# Patient Record
Sex: MALE | Race: WHITE | NOT HISPANIC OR LATINO
[De-identification: names, ages, dates, MRNs, and addresses within clinical notes are randomized per-mention and may not be internally consistent; named-entity substitution may affect disease eponyms.]

---

## 2017-02-18 ENCOUNTER — APPOINTMENT (OUTPATIENT)
Dept: OTOLARYNGOLOGY | Facility: CLINIC | Age: 62
End: 2017-02-18

## 2017-02-18 VITALS
SYSTOLIC BLOOD PRESSURE: 160 MMHG | HEART RATE: 89 BPM | BODY MASS INDEX: 30.8 KG/M2 | WEIGHT: 240 LBS | DIASTOLIC BLOOD PRESSURE: 90 MMHG | HEIGHT: 74 IN

## 2017-08-21 ENCOUNTER — APPOINTMENT (OUTPATIENT)
Dept: OTOLARYNGOLOGY | Facility: CLINIC | Age: 62
End: 2017-08-21
Payer: COMMERCIAL

## 2017-08-21 VITALS
BODY MASS INDEX: 30.8 KG/M2 | SYSTOLIC BLOOD PRESSURE: 139 MMHG | WEIGHT: 240 LBS | HEIGHT: 74 IN | DIASTOLIC BLOOD PRESSURE: 75 MMHG

## 2017-08-21 PROCEDURE — 69220 CLEAN OUT MASTOID CAVITY: CPT

## 2017-08-21 PROCEDURE — 99213 OFFICE O/P EST LOW 20 MIN: CPT | Mod: 25

## 2017-08-21 RX ORDER — OXYBUTYNIN CHLORIDE 10 MG/1
10 TABLET, EXTENDED RELEASE ORAL
Qty: 30 | Refills: 0 | Status: ACTIVE | COMMUNITY
Start: 2017-01-10

## 2017-11-20 ENCOUNTER — APPOINTMENT (OUTPATIENT)
Dept: OTOLARYNGOLOGY | Facility: CLINIC | Age: 62
End: 2017-11-20
Payer: COMMERCIAL

## 2017-11-20 VITALS
HEIGHT: 74 IN | DIASTOLIC BLOOD PRESSURE: 90 MMHG | WEIGHT: 240 LBS | SYSTOLIC BLOOD PRESSURE: 139 MMHG | BODY MASS INDEX: 30.8 KG/M2

## 2017-11-20 DIAGNOSIS — H90.5 UNSPECIFIED SENSORINEURAL HEARING LOSS: ICD-10-CM

## 2017-11-20 PROCEDURE — 99213 OFFICE O/P EST LOW 20 MIN: CPT | Mod: 25

## 2017-11-20 PROCEDURE — 92567 TYMPANOMETRY: CPT

## 2017-11-20 PROCEDURE — 92557 COMPREHENSIVE HEARING TEST: CPT

## 2018-02-22 ENCOUNTER — APPOINTMENT (OUTPATIENT)
Dept: OTOLARYNGOLOGY | Facility: CLINIC | Age: 63
End: 2018-02-22
Payer: COMMERCIAL

## 2018-02-22 PROCEDURE — 69220 CLEAN OUT MASTOID CAVITY: CPT

## 2018-02-22 PROCEDURE — 99212 OFFICE O/P EST SF 10 MIN: CPT | Mod: 25

## 2018-02-22 RX ORDER — CIPROFLOXACIN HYDROCHLORIDE 500 MG/1
500 TABLET, FILM COATED ORAL TWICE DAILY
Qty: 20 | Refills: 1 | Status: COMPLETED | COMMUNITY
Start: 2017-02-18 | End: 2018-02-22

## 2018-02-22 RX ORDER — AMOXICILLIN AND CLAVULANATE POTASSIUM 875; 125 MG/1; MG/1
875-125 TABLET, COATED ORAL
Qty: 20 | Refills: 0 | Status: COMPLETED | COMMUNITY
Start: 2017-08-21 | End: 2018-02-22

## 2018-02-22 RX ORDER — AMOXICILLIN AND CLAVULANATE POTASSIUM 875; 125 MG/1; MG/1
875-125 TABLET, COATED ORAL
Qty: 20 | Refills: 0 | Status: COMPLETED | COMMUNITY
Start: 2017-11-20 | End: 2018-02-22

## 2018-02-28 ENCOUNTER — APPOINTMENT (OUTPATIENT)
Dept: OTOLARYNGOLOGY | Facility: CLINIC | Age: 63
End: 2018-02-28

## 2018-06-18 ENCOUNTER — APPOINTMENT (OUTPATIENT)
Dept: OTOLARYNGOLOGY | Facility: CLINIC | Age: 63
End: 2018-06-18
Payer: COMMERCIAL

## 2018-06-18 VITALS
BODY MASS INDEX: 31.44 KG/M2 | HEIGHT: 74 IN | DIASTOLIC BLOOD PRESSURE: 83 MMHG | SYSTOLIC BLOOD PRESSURE: 133 MMHG | WEIGHT: 245 LBS

## 2018-06-18 PROCEDURE — 99213 OFFICE O/P EST LOW 20 MIN: CPT

## 2018-10-17 ENCOUNTER — APPOINTMENT (OUTPATIENT)
Dept: OTOLARYNGOLOGY | Facility: CLINIC | Age: 63
End: 2018-10-17
Payer: COMMERCIAL

## 2018-10-17 VITALS
SYSTOLIC BLOOD PRESSURE: 140 MMHG | WEIGHT: 245 LBS | HEIGHT: 74 IN | DIASTOLIC BLOOD PRESSURE: 84 MMHG | BODY MASS INDEX: 31.44 KG/M2

## 2018-10-17 DIAGNOSIS — Z87.09 PERSONAL HISTORY OF OTHER DISEASES OF THE RESPIRATORY SYSTEM: ICD-10-CM

## 2018-10-17 DIAGNOSIS — B96.89 PERSONAL HISTORY OF OTHER DISEASES OF THE RESPIRATORY SYSTEM: ICD-10-CM

## 2018-10-17 PROCEDURE — 99214 OFFICE O/P EST MOD 30 MIN: CPT

## 2018-12-26 ENCOUNTER — MEDICATION RENEWAL (OUTPATIENT)
Age: 63
End: 2018-12-26

## 2019-01-16 ENCOUNTER — APPOINTMENT (OUTPATIENT)
Dept: OTOLARYNGOLOGY | Facility: CLINIC | Age: 64
End: 2019-01-16
Payer: COMMERCIAL

## 2019-01-16 PROCEDURE — 92567 TYMPANOMETRY: CPT

## 2019-01-16 PROCEDURE — 69220 CLEAN OUT MASTOID CAVITY: CPT | Mod: LT

## 2019-01-16 PROCEDURE — 99213 OFFICE O/P EST LOW 20 MIN: CPT | Mod: 25

## 2019-01-16 PROCEDURE — 92557 COMPREHENSIVE HEARING TEST: CPT

## 2019-01-18 NOTE — PROCEDURE
[Same] : same as the Pre Op Dx. [] : Debridement of Mastoid [FreeTextEntry1] : left CWD [FreeTextEntry4] : none [FreeTextEntry6] : Canal wall down mastoid debrided under microscope using suction, alligator and curette.  Patient tolerated procedure well - flat granulation under good control - filled with tobradex ointment again

## 2019-01-18 NOTE — PHYSICAL EXAM
[Midline] : trachea located in midline position [Normal] : no nystagmus [de-identified] : RIGHT TM NORMAL, LEFT CWD - flat granulation - filled tobradex ointment

## 2019-01-18 NOTE — HISTORY OF PRESENT ILLNESS
[de-identified] : 64 yo returns for routine left ear cleaning- hx left CWD - here for routine cleaning.   No c/o ear drainage or vertigo.  Has been having some recurrent sinus infections- last tx with augmentin in December- does not use any daily nasal spray

## 2019-01-26 ENCOUNTER — INPATIENT (INPATIENT)
Facility: HOSPITAL | Age: 64
LOS: 13 days | Discharge: HOME | End: 2019-02-09
Attending: INTERNAL MEDICINE | Admitting: INTERNAL MEDICINE
Payer: COMMERCIAL

## 2019-01-26 VITALS
WEIGHT: 240.08 LBS | RESPIRATION RATE: 18 BRPM | SYSTOLIC BLOOD PRESSURE: 132 MMHG | HEART RATE: 77 BPM | TEMPERATURE: 97 F | OXYGEN SATURATION: 100 % | DIASTOLIC BLOOD PRESSURE: 92 MMHG | HEIGHT: 74 IN

## 2019-01-26 LAB
ALBUMIN SERPL ELPH-MCNC: 3.8 G/DL — SIGNIFICANT CHANGE UP (ref 3.3–5)
ALP SERPL-CCNC: 90 U/L — SIGNIFICANT CHANGE UP (ref 40–120)
ALT FLD-CCNC: 161 U/L — HIGH (ref 12–78)
ANION GAP SERPL CALC-SCNC: 9 MMOL/L — SIGNIFICANT CHANGE UP (ref 5–17)
APPEARANCE UR: CLEAR — SIGNIFICANT CHANGE UP
APTT BLD: 30.7 SEC — SIGNIFICANT CHANGE UP (ref 27.5–36.3)
AST SERPL-CCNC: 63 U/L — HIGH (ref 15–37)
BACTERIA # UR AUTO: NEGATIVE — SIGNIFICANT CHANGE UP
BILIRUB SERPL-MCNC: 1.2 MG/DL — SIGNIFICANT CHANGE UP (ref 0.2–1.2)
BILIRUB UR-MCNC: NEGATIVE — SIGNIFICANT CHANGE UP
BUN SERPL-MCNC: 20 MG/DL — SIGNIFICANT CHANGE UP (ref 7–23)
CALCIUM SERPL-MCNC: 9.3 MG/DL — SIGNIFICANT CHANGE UP (ref 8.5–10.1)
CHLORIDE SERPL-SCNC: 114 MMOL/L — HIGH (ref 96–108)
CO2 SERPL-SCNC: 23 MMOL/L — SIGNIFICANT CHANGE UP (ref 22–31)
COLOR SPEC: YELLOW — SIGNIFICANT CHANGE UP
CREAT SERPL-MCNC: 1.46 MG/DL — HIGH (ref 0.5–1.3)
D DIMER BLD IA.RAPID-MCNC: 201 NG/ML DDU — SIGNIFICANT CHANGE UP
DIFF PNL FLD: NEGATIVE — SIGNIFICANT CHANGE UP
EPI CELLS # UR: SIGNIFICANT CHANGE UP
GLUCOSE SERPL-MCNC: 80 MG/DL — SIGNIFICANT CHANGE UP (ref 70–99)
GLUCOSE UR QL: NEGATIVE MG/DL — SIGNIFICANT CHANGE UP
HCT VFR BLD CALC: 51.1 % — HIGH (ref 39–50)
HGB BLD-MCNC: 17 G/DL — SIGNIFICANT CHANGE UP (ref 13–17)
INR BLD: 1.47 RATIO — HIGH (ref 0.88–1.16)
KETONES UR-MCNC: NEGATIVE — SIGNIFICANT CHANGE UP
LEUKOCYTE ESTERASE UR-ACNC: ABNORMAL
MAGNESIUM SERPL-MCNC: 1.9 MG/DL — SIGNIFICANT CHANGE UP (ref 1.6–2.6)
MCHC RBC-ENTMCNC: 29.6 PG — SIGNIFICANT CHANGE UP (ref 27–34)
MCHC RBC-ENTMCNC: 33.3 GM/DL — SIGNIFICANT CHANGE UP (ref 32–36)
MCV RBC AUTO: 88.9 FL — SIGNIFICANT CHANGE UP (ref 80–100)
NITRITE UR-MCNC: NEGATIVE — SIGNIFICANT CHANGE UP
NRBC # BLD: 0 /100 WBCS — SIGNIFICANT CHANGE UP (ref 0–0)
NT-PROBNP SERPL-SCNC: 5222 PG/ML — HIGH (ref 0–125)
PH UR: 5 — SIGNIFICANT CHANGE UP (ref 5–8)
PLATELET # BLD AUTO: 170 K/UL — SIGNIFICANT CHANGE UP (ref 150–400)
POTASSIUM SERPL-MCNC: 3.9 MMOL/L — SIGNIFICANT CHANGE UP (ref 3.5–5.3)
POTASSIUM SERPL-SCNC: 3.9 MMOL/L — SIGNIFICANT CHANGE UP (ref 3.5–5.3)
PROT SERPL-MCNC: 7 GM/DL — SIGNIFICANT CHANGE UP (ref 6–8.3)
PROT UR-MCNC: 15 MG/DL
PROTHROM AB SERPL-ACNC: 16.5 SEC — HIGH (ref 10–12.9)
RBC # BLD: 5.75 M/UL — SIGNIFICANT CHANGE UP (ref 4.2–5.8)
RBC # FLD: 12.6 % — SIGNIFICANT CHANGE UP (ref 10.3–14.5)
RBC CASTS # UR COMP ASSIST: NEGATIVE /HPF — SIGNIFICANT CHANGE UP (ref 0–4)
SODIUM SERPL-SCNC: 146 MMOL/L — HIGH (ref 135–145)
SP GR SPEC: 1.02 — SIGNIFICANT CHANGE UP (ref 1.01–1.02)
TROPONIN I SERPL-MCNC: 0.03 NG/ML — SIGNIFICANT CHANGE UP (ref 0.01–0.04)
UROBILINOGEN FLD QL: NEGATIVE MG/DL — SIGNIFICANT CHANGE UP
WBC # BLD: 7.93 K/UL — SIGNIFICANT CHANGE UP (ref 3.8–10.5)
WBC # FLD AUTO: 7.93 K/UL — SIGNIFICANT CHANGE UP (ref 3.8–10.5)
WBC UR QL: SIGNIFICANT CHANGE UP

## 2019-01-26 PROCEDURE — 71045 X-RAY EXAM CHEST 1 VIEW: CPT | Mod: 26

## 2019-01-26 PROCEDURE — 70450 CT HEAD/BRAIN W/O DYE: CPT | Mod: 26

## 2019-01-26 PROCEDURE — 99223 1ST HOSP IP/OBS HIGH 75: CPT

## 2019-01-26 PROCEDURE — 99285 EMERGENCY DEPT VISIT HI MDM: CPT

## 2019-01-26 PROCEDURE — 93010 ELECTROCARDIOGRAM REPORT: CPT | Mod: 76

## 2019-01-26 RX ORDER — HEPARIN SODIUM 5000 [USP'U]/ML
9000 INJECTION INTRAVENOUS; SUBCUTANEOUS EVERY 6 HOURS
Qty: 0 | Refills: 0 | Status: DISCONTINUED | OUTPATIENT
Start: 2019-01-26 | End: 2019-01-28

## 2019-01-26 RX ORDER — LEVOTHYROXINE SODIUM 125 MCG
25 TABLET ORAL DAILY
Qty: 0 | Refills: 0 | Status: DISCONTINUED | OUTPATIENT
Start: 2019-01-26 | End: 2019-02-09

## 2019-01-26 RX ORDER — TAMSULOSIN HYDROCHLORIDE 0.4 MG/1
0.4 CAPSULE ORAL AT BEDTIME
Qty: 0 | Refills: 0 | Status: DISCONTINUED | OUTPATIENT
Start: 2019-01-26 | End: 2019-02-09

## 2019-01-26 RX ORDER — DILTIAZEM HCL 120 MG
10 CAPSULE, EXT RELEASE 24 HR ORAL
Qty: 125 | Refills: 0 | Status: DISCONTINUED | OUTPATIENT
Start: 2019-01-26 | End: 2019-01-29

## 2019-01-26 RX ORDER — HEPARIN SODIUM 5000 [USP'U]/ML
4500 INJECTION INTRAVENOUS; SUBCUTANEOUS EVERY 6 HOURS
Qty: 0 | Refills: 0 | Status: DISCONTINUED | OUTPATIENT
Start: 2019-01-26 | End: 2019-01-28

## 2019-01-26 RX ORDER — OXYBUTYNIN CHLORIDE 5 MG
10 TABLET ORAL DAILY
Qty: 0 | Refills: 0 | Status: DISCONTINUED | OUTPATIENT
Start: 2019-01-26 | End: 2019-02-09

## 2019-01-26 RX ORDER — SODIUM CHLORIDE 9 MG/ML
1000 INJECTION INTRAMUSCULAR; INTRAVENOUS; SUBCUTANEOUS ONCE
Qty: 0 | Refills: 0 | Status: COMPLETED | OUTPATIENT
Start: 2019-01-26 | End: 2019-01-26

## 2019-01-26 RX ORDER — HEPARIN SODIUM 5000 [USP'U]/ML
INJECTION INTRAVENOUS; SUBCUTANEOUS
Qty: 25000 | Refills: 0 | Status: DISCONTINUED | OUTPATIENT
Start: 2019-01-26 | End: 2019-01-28

## 2019-01-26 RX ADMIN — SODIUM CHLORIDE 1000 MILLILITER(S): 9 INJECTION INTRAMUSCULAR; INTRAVENOUS; SUBCUTANEOUS at 18:07

## 2019-01-26 RX ADMIN — Medication 10 MG/HR: at 18:09

## 2019-01-26 RX ADMIN — SODIUM CHLORIDE 1000 MILLILITER(S): 9 INJECTION INTRAMUSCULAR; INTRAVENOUS; SUBCUTANEOUS at 19:07

## 2019-01-26 RX ADMIN — HEPARIN SODIUM 2000 UNIT(S)/HR: 5000 INJECTION INTRAVENOUS; SUBCUTANEOUS at 19:25

## 2019-01-26 NOTE — ED ADULT NURSE NOTE - OBJECTIVE STATEMENT
Pt BIBA s/p L sided facial "droop" as per family member at bedside. Pt as per family member became altered and confused was not at baseline and urinated on himself. Pt reports no previous medical hx prior. When pt arrived to Ed pt was a+ox3, states he's felt "weird" for a couple days but no symptoms were reported. Pt's HR was elevated, no cardiac hx prior. Pt denies chest pain/sob at this time. Neuro intact at this time.

## 2019-01-26 NOTE — ED ADULT TRIAGE NOTE - CHIEF COMPLAINT QUOTE
pt brought by EMS from birthday party states approx 5 mins prior to EMS arrival, pt developed slurred speech, diaphoresis, AMS and urinary incontinence. pt is currently awake alert and oriented x3 and is able to recall events leading to EMS arrival.

## 2019-01-26 NOTE — ED PROVIDER NOTE - CARE PLAN
Principal Discharge DX:	TIA (transient ischemic attack)  Secondary Diagnosis:	Rapid atrial fibrillation

## 2019-01-26 NOTE — ED PROVIDER NOTE - OBJECTIVE STATEMENT
62 y/o male with a PMHx of HLD presents to the ED c/o episode of AMS today. Pt was at a party today when he suddenly began having slurred speech, facial droop, and diaphoresis. +urinary incontinence. Episode lasted approximately 5 minutes, and then sx completely resolved. No LOC. Now with SOB. As per pt, he has been "not feeling well" for a month, with palpitations and SOB described as "throat tightness." Pt was seen by PMD, was prescribed Augmentin for possible sinus infection, but with no relief of SOB. Pt has been taking Tylenol PM for sx and for sleep aid. Pt also has a cyst on his abd, for which he is on abx. Denies tobacco use, illicit drug use. Pt drank 1.5 glasses of wine at party today. Pt has been on a diet to lose weight, has decreased his portion sizes and decreased sugary and fatty foods in his diet, does not eat red meat. Exercises 3-4 days per week. PMD- Dr. Kraft. DEE DEE.

## 2019-01-26 NOTE — ED PROVIDER NOTE - MEDICAL DECISION MAKING DETAILS
Pt with episode of slurred speech, facial droop, urinary incontinence, lasting about 4 minutes now resolved. Now with rapid A-fib. Plan for labs, hydration, Cardizem IV, CT head, admission.

## 2019-01-26 NOTE — ED PROVIDER NOTE - PROGRESS NOTE DETAILS
Scribe CD for ED attending, Doctor Baker. Pt improved. Carson DE JESSU for ED attending, Doctor Samuel. Spoke to Dr. Sandoval, who recommends heparinizing pt if CT negative.

## 2019-01-27 DIAGNOSIS — G45.9 TRANSIENT CEREBRAL ISCHEMIC ATTACK, UNSPECIFIED: ICD-10-CM

## 2019-01-27 LAB
ALBUMIN SERPL ELPH-MCNC: 3.4 G/DL — SIGNIFICANT CHANGE UP (ref 3.3–5)
ALP SERPL-CCNC: 68 U/L — SIGNIFICANT CHANGE UP (ref 40–120)
ALT FLD-CCNC: 126 U/L — HIGH (ref 12–78)
ANION GAP SERPL CALC-SCNC: 7 MMOL/L — SIGNIFICANT CHANGE UP (ref 5–17)
APTT BLD: 144.5 SEC — CRITICAL HIGH (ref 27.5–36.3)
APTT BLD: 90.5 SEC — HIGH (ref 27.5–36.3)
APTT BLD: 91.4 SEC — HIGH (ref 27.5–36.3)
AST SERPL-CCNC: 43 U/L — HIGH (ref 15–37)
BILIRUB SERPL-MCNC: 1.8 MG/DL — HIGH (ref 0.2–1.2)
BUN SERPL-MCNC: 13 MG/DL — SIGNIFICANT CHANGE UP (ref 7–23)
CALCIUM SERPL-MCNC: 8.8 MG/DL — SIGNIFICANT CHANGE UP (ref 8.5–10.1)
CHLORIDE SERPL-SCNC: 117 MMOL/L — HIGH (ref 96–108)
CHOLEST SERPL-MCNC: 147 MG/DL — SIGNIFICANT CHANGE UP (ref 10–199)
CO2 SERPL-SCNC: 21 MMOL/L — LOW (ref 22–31)
CREAT SERPL-MCNC: 1.25 MG/DL — SIGNIFICANT CHANGE UP (ref 0.5–1.3)
GLUCOSE SERPL-MCNC: 97 MG/DL — SIGNIFICANT CHANGE UP (ref 70–99)
HAV IGM SER-ACNC: SIGNIFICANT CHANGE UP
HBA1C BLD-MCNC: 5.3 % — SIGNIFICANT CHANGE UP (ref 4–5.6)
HBV CORE IGM SER-ACNC: SIGNIFICANT CHANGE UP
HBV SURFACE AG SER-ACNC: SIGNIFICANT CHANGE UP
HCT VFR BLD CALC: 46.6 % — SIGNIFICANT CHANGE UP (ref 39–50)
HCV AB S/CO SERPL IA: 0.14 S/CO — SIGNIFICANT CHANGE UP
HCV AB SERPL-IMP: SIGNIFICANT CHANGE UP
HDLC SERPL-MCNC: 43 MG/DL — SIGNIFICANT CHANGE UP
HGB BLD-MCNC: 15.5 G/DL — SIGNIFICANT CHANGE UP (ref 13–17)
LIPID PNL WITH DIRECT LDL SERPL: 91 MG/DL — SIGNIFICANT CHANGE UP
MCHC RBC-ENTMCNC: 29.5 PG — SIGNIFICANT CHANGE UP (ref 27–34)
MCHC RBC-ENTMCNC: 33.3 GM/DL — SIGNIFICANT CHANGE UP (ref 32–36)
MCV RBC AUTO: 88.6 FL — SIGNIFICANT CHANGE UP (ref 80–100)
NRBC # BLD: 0 /100 WBCS — SIGNIFICANT CHANGE UP (ref 0–0)
PLATELET # BLD AUTO: 149 K/UL — LOW (ref 150–400)
POTASSIUM SERPL-MCNC: 4.1 MMOL/L — SIGNIFICANT CHANGE UP (ref 3.5–5.3)
POTASSIUM SERPL-SCNC: 4.1 MMOL/L — SIGNIFICANT CHANGE UP (ref 3.5–5.3)
PROT SERPL-MCNC: 6.4 GM/DL — SIGNIFICANT CHANGE UP (ref 6–8.3)
RBC # BLD: 5.26 M/UL — SIGNIFICANT CHANGE UP (ref 4.2–5.8)
RBC # FLD: 12.6 % — SIGNIFICANT CHANGE UP (ref 10.3–14.5)
SODIUM SERPL-SCNC: 145 MMOL/L — SIGNIFICANT CHANGE UP (ref 135–145)
TOTAL CHOLESTEROL/HDL RATIO MEASUREMENT: 3.4 RATIO — SIGNIFICANT CHANGE UP (ref 3.4–9.6)
TRIGL SERPL-MCNC: 64 MG/DL — SIGNIFICANT CHANGE UP (ref 10–149)
TROPONIN I SERPL-MCNC: 0.04 NG/ML — SIGNIFICANT CHANGE UP (ref 0.01–0.04)
TSH SERPL-MCNC: 1.27 UU/ML — SIGNIFICANT CHANGE UP (ref 0.34–4.82)
WBC # BLD: 7.15 K/UL — SIGNIFICANT CHANGE UP (ref 3.8–10.5)
WBC # FLD AUTO: 7.15 K/UL — SIGNIFICANT CHANGE UP (ref 3.8–10.5)

## 2019-01-27 RX ORDER — INFLUENZA VIRUS VACCINE 15; 15; 15; 15 UG/.5ML; UG/.5ML; UG/.5ML; UG/.5ML
0.5 SUSPENSION INTRAMUSCULAR ONCE
Qty: 0 | Refills: 0 | Status: COMPLETED | OUTPATIENT
Start: 2019-01-27 | End: 2019-01-27

## 2019-01-27 RX ORDER — METOPROLOL TARTRATE 50 MG
25 TABLET ORAL
Qty: 0 | Refills: 0 | Status: DISCONTINUED | OUTPATIENT
Start: 2019-01-27 | End: 2019-01-28

## 2019-01-27 RX ORDER — ACETAMINOPHEN 500 MG
650 TABLET ORAL EVERY 6 HOURS
Qty: 0 | Refills: 0 | Status: DISCONTINUED | OUTPATIENT
Start: 2019-01-27 | End: 2019-01-27

## 2019-01-27 RX ORDER — ACETAMINOPHEN 500 MG
650 TABLET ORAL ONCE
Qty: 0 | Refills: 0 | Status: COMPLETED | OUTPATIENT
Start: 2019-01-27 | End: 2019-01-27

## 2019-01-27 RX ADMIN — Medication 25 MILLIGRAM(S): at 14:40

## 2019-01-27 RX ADMIN — HEPARIN SODIUM 1600 UNIT(S)/HR: 5000 INJECTION INTRAVENOUS; SUBCUTANEOUS at 11:20

## 2019-01-27 RX ADMIN — Medication 650 MILLIGRAM(S): at 04:31

## 2019-01-27 RX ADMIN — Medication 100 MILLIGRAM(S): at 17:30

## 2019-01-27 RX ADMIN — HEPARIN SODIUM 1600 UNIT(S)/HR: 5000 INJECTION INTRAVENOUS; SUBCUTANEOUS at 18:19

## 2019-01-27 RX ADMIN — Medication 25 MICROGRAM(S): at 06:25

## 2019-01-27 RX ADMIN — HEPARIN SODIUM 0 UNIT(S)/HR: 5000 INJECTION INTRAVENOUS; SUBCUTANEOUS at 10:17

## 2019-01-27 RX ADMIN — Medication 100 MILLIGRAM(S): at 06:25

## 2019-01-27 RX ADMIN — Medication 10 MILLIGRAM(S): at 12:40

## 2019-01-27 RX ADMIN — TAMSULOSIN HYDROCHLORIDE 0.4 MILLIGRAM(S): 0.4 CAPSULE ORAL at 21:29

## 2019-01-27 NOTE — CONSULT NOTE ADULT - SUBJECTIVE AND OBJECTIVE BOX
Patient is a 63y old  Male who presents with a chief complaint of complain of slurry speech and altered mental status (2019 00:15)      HPI:  62 yo male presents to the ED  with complain of episode of AMS today. Patient was at a party today when he suddenly began having slurred speech, facial droop, and diaphoresis. he also had +urinary incontinence. Episode lasted approximately 5 minutes, and then symptoms completely resolved. No LOC. Then he started to have SOB. He still felt SOB during his presentation in ED. As per patient, he has been "not feeling well" for a month, with palpitations and SOB described as "throat tightness." Patient was seen by PMD, was prescribed Augmentin for possible sinus infection, but with no relief of SOB. Patient has been taking Tylenol PM for symptoms and for sleep aid. Patient also has a cyst on his abd, for which he is on abx, doxycycline. Denies tobacco use, illicit drug use. Pt drank 1.5 glasses of wine at party today. Patient has been on a diet to lose weight, has decreased his portion sizes and decreased sugary and fatty foods in his diet, does not eat red meat. Exercises 3-4 days per week.  During my evaluation at the bed side patient was totally any symptom free. No slurry speech no sob, no palpitation.   He exercises regulary without CP , although over the past week he's noted a decline in execrise function.  He's also noted that over the past month he's had intermittent palpitations but more sustained over the past few days    Family Hx:  Mother: Alive, 91 yr, had colon cancer, DM, Aortic valve replacement  Father:  from Stomach cancer    PMHx:  BPH  Hypothyroidism     PSHx:  No recent surgeries. (2019 00:15)      PAST MEDICAL & SURGICAL HISTORY:  HLD (hyperlipidemia)                                    MEDICATIONS  (STANDING):  diltiazem    Tablet 30 milliGRAM(s) Oral every 6 hours  diltiazem Infusion 10 mG/Hr (10 mL/Hr) IV Continuous <Continuous>  doxycycline hyclate Capsule 100 milliGRAM(s) Oral every 12 hours  heparin  Infusion.  Unit(s)/Hr (20 mL/Hr) IV Continuous <Continuous>  influenza   Vaccine 0.5 milliLiter(s) IntraMuscular once  levothyroxine 25 MICROGram(s) Oral daily  oxybutynin 10 milliGRAM(s) Oral daily  tamsulosin 0.4 milliGRAM(s) Oral at bedtime    MEDICATIONS  (PRN):  heparin  Injectable 9000 Unit(s) IV Push every 6 hours PRN For aPTT less than 40  heparin  Injectable 4500 Unit(s) IV Push every 6 hours PRN For aPTT between 40 - 57      FAMILY HISTORY:  NC    SOCIAL HISTORY: former smoker quit 5 years ago     CIGARETTES:        Vital Signs Last 24 Hrs  T(C): 36.7 (2019 04:26), Max: 37.1 (2019 20:12)  T(F): 98 (2019 04:26), Max: 98.8 (2019 20:12)  HR: 70 (2019 04:26) (70 - 116)  BP: 110/65 (2019 04:26) (105/78 - 143/84)  BP(mean): --  RR: 18 (2019 03:31) (16 - 22)  SpO2: 98% (2019 04:26) (97% - 100%)            INTERPRETATION OF TELEMETRY: AFIb    ECG: AFIb no acute STT changes    I&O's Detail    2019 07:01  -  2019 07:00  --------------------------------------------------------  IN:    diltiazem Infusion: 146.8 mL    heparin  Infusion.: 218.9 mL  Total IN: 365.7 mL    OUT:  Total OUT: 0 mL    Total NET: 365.7 mL          LABS:                        15.5   7.15  )-----------( 149      ( 2019 01:13 )             46.6         146<H>  |  114<H>  |  20  ----------------------------<  80  3.9   |  23  |  1.46<H>    Ca    9.3      2019 17:53  Mg     1.9         TPro  7.0  /  Alb  3.8  /  TBili  1.2  /  DBili  x   /  AST  63<H>  /  ALT  161<H>  /  AlkPhos  90      CARDIAC MARKERS ( 2019 17:53 )  0.029 ng/mL / x     / x     / x     / x          PT/INR - ( 2019 17:53 )   PT: 16.5 sec;   INR: 1.47 ratio         PTT - ( 2019 01:13 )  PTT:90.5 sec  Urinalysis Basic - ( 2019 19:30 )    Color: Yellow / Appearance: Clear / S.025 / pH: x  Gluc: x / Ketone: Negative  / Bili: Negative / Urobili: Negative mg/dL   Blood: x / Protein: 15 mg/dL / Nitrite: Negative   Leuk Esterase: Trace / RBC: Negative /HPF / WBC 3-5   Sq Epi: x / Non Sq Epi: Occasional / Bacteria: Negative      I&O's Summary    2019 07:01  -  2019 07:00  --------------------------------------------------------  IN: 365.7 mL / OUT: 0 mL / NET: 365.7 mL      BNPSerum Pro-Brain Natriuretic Peptide: 5222 pg/mL ( @ 17:53)    RADIOLOGY & ADDITIONAL STUDIES:

## 2019-01-27 NOTE — CONSULT NOTE ADULT - ASSESSMENT
63 year old man with new a-fib, transient facial weakness, slurred speech; non focal exam presently. Initial head CT showed no acute findings. ?Probably embolic event, ? CVA vs TIA

## 2019-01-27 NOTE — SWALLOW BEDSIDE ASSESSMENT ADULT - SLP GENERAL OBSERVATIONS
The pt was alert and interactive. He was somewhat anxious and tearful at times. the pt c/o a transient facial droop which precipitated this admission but now resolved. He is currently oriented x3+ and able to verbalize during communicative probes as well as in conversation without evidence of an overt primary motor speech or primary linguistic pathology. The pt is communicatively competent and reportedly at communicative baseline. Pt denied Odynophagia or aspiration signs when eating. The pt was alert and interactive. He was somewhat anxious and tearful at times. The pt c/o a transient facial droop which precipitated this admission but now resolved. He is currently oriented x3+ and able to verbalize during communicative probes as well as in conversation without evidence of an overt primary motor speech or primary linguistic pathology. The pt is communicatively competent and reportedly at communicative baseline. Pt denied Odynophagia or aspiration signs when eating.

## 2019-01-27 NOTE — CONSULT NOTE ADULT - ASSESSMENT
64 yo male presents to the ED  with complain of episode of AMS today. Patient was at a party today when he suddenly began having slurred speech, facial droop, and diaphoresis. he also had +urinary incontinence. Episode lasted approximately 5 minutes, and then symptoms completely resolved. No LOC. Then he started to have SOB. He still felt SOB during his presentation in ED. As per patient, he has been "not feeling well" for a month, with palpitations and SOB described as "throat tightness." Patient was seen by PMD, was prescribed Augmentin for possible sinus infection, but with no relief of SOB. Patient has been taking Tylenol PM for symptoms and for sleep aid. Patient also has a cyst on his abd, for which he is on abx, doxycycline. Denies tobacco use, illicit drug use. Pt drank 1.5 glasses of wine at party today. Patient has been on a diet to lose weight, has decreased his portion sizes and decreased sugary and fatty foods in his diet, does not eat red meat. Exercises 3-4 days per week.  1) will start with TTE   2) start LOpressor PO for HR control  3) can change to eliquis if OK with neuro   4) wean off IV cardizem  5) outpt ischmic brand  6)would not cardiovert givne recent neurologic event

## 2019-01-27 NOTE — SWALLOW BEDSIDE ASSESSMENT ADULT - SWALLOW EVAL: RECOMMENDED DIET
SUGGEST A REGULAR TEXTURE DIET WITH THIN LIQUID CONSISTENCIES AS HE TOLERATES THE SAME FROM AN OROPHARYNGEAL SWALLOWING PERSPECTIVE AT THIS TIME.

## 2019-01-27 NOTE — H&P ADULT - NEUROLOGICAL DETAILS
deep reflexes intact/normal strength/sensation intact/alert and oriented x 3/responds to pain/cranial nerves intact/responds to verbal commands

## 2019-01-27 NOTE — PHYSICAL THERAPY INITIAL EVALUATION ADULT - PERTINENT HX OF CURRENT PROBLEM, REHAB EVAL
62 yo male presents to the ED  with complain of episode of AMS today. Patient was at a party today when he suddenly began having slurred speech, facial droop, and diaphoresis. he also had +urinary incontinence. Episode lasted approximately 5 minutes, and then symptoms completely resolved. No LOC. Then he started to have SOB. He still felt SOB during his presentation in ED. As per patient, he has been "not feeling well" for a month, with palpitations and SOB described as "throat tightness."

## 2019-01-27 NOTE — SWALLOW BEDSIDE ASSESSMENT ADULT - SWALLOW EVAL: CRITERIA FOR SKILLED INTERVENTION MET
ACUTE SPEECH PATHOLOGY INTERVENTION IS NOT CLINICALLY WARRANTED. HIS SPEECH-LANGUAGE AND OROPHARYNGEAL SWALLOWING ABILITIES ARE WITHIN FUNCTIONAL PARAMETERS. GIVEN ABOVE, WILL NOT ACTIVELY FOLLOW. RECONSULT PRN.

## 2019-01-27 NOTE — SWALLOW BEDSIDE ASSESSMENT ADULT - COMMENTS
The pt was admitted to  with right facial droop, slurred speech, diaphoresis and SOB. His symptoms are improving. He has an underlying history of HLD, BPH and hypothyroidism.

## 2019-01-27 NOTE — H&P ADULT - HISTORY OF PRESENT ILLNESS
62 yo male presents to the ED  with complain of episode of AMS today. Patient was at a party today when he suddenly began having slurred speech, facial droop, and diaphoresis. he also had +urinary incontinence. Episode lasted approximately 5 minutes, and then symptoms completely resolved. No LOC. Then he started to have SOB. He still felt SOB during his presentation in ED. As per patient, he has been "not feeling well" for a month, with palpitations and SOB described as "throat tightness." Patient was seen by PMD, was prescribed Augmentin for possible sinus infection, but with no relief of SOB. Patient has been taking Tylenol PM for symptoms and for sleep aid. Patient also has a cyst on his abd, for which he is on abx, doxycycline. Denies tobacco use, illicit drug use. Pt drank 1.5 glasses of wine at party today. Patient has been on a diet to lose weight, has decreased his portion sizes and decreased sugary and fatty foods in his diet, does not eat red meat. Exercises 3-4 days per week.  During my evaluation at the bed side patient was totally any symptom free. No slurry speech no sob, no palpitation.     Family Hx:  Mother: Alive, 91 yr, had colon cancer, DM, Aortic valve replacement  Father:  from Stomach cancer    PMHx:  BPH  Hypothyroidism     PSHx:  No recent surgeries.

## 2019-01-27 NOTE — PROVIDER CONTACT NOTE (OTHER) - SITUATION
md aware of consult called dr. grayson's service, was told dr. acuña is on call. left consult with dr. le service. md aware of consult

## 2019-01-27 NOTE — CONSULT NOTE ADULT - SUBJECTIVE AND OBJECTIVE BOX
Neurology Consult requested by:   Patient is a 63y old  Male who presents with a chief complaint of complain of slurry speech and altered mental status (2019 09:46)     HPI:  64 yo male presents to the ED, after transient episode of slurred speech, facial weakness, which happened while at a party. Symptoms resolved, with no sequelae. Denies associated weakness, numbness of the extremities, dizziness, loss of vision no double vision, no headache.   Felt sweaty, and also had urinary incontinence. Episode lasted approximately 5 minutes, and then symptoms completely resolved. No LOC.   Noticed palpitations, which he has been feeling for several weeks, also last few days LUNA.  Denies tobacco use, illicit drug use. Pt drank 1.5 glasses of wine at party today.     Family Hx:  Mother: Alive, 91 yr, had colon cancer, DM, Aortic valve replacement  Father:  from Stomach cancer    PMHx:  BPH  Hypothyroidism     PSHx:  No recent surgeries. (2019 00:15)      PAST MEDICAL & SURGICAL HISTORY:  HLD (hyperlipidemia)    FAMILY HISTORY:    Social Hx:  Nonsmoker, no drug or alcohol use  Medications and Allergies ReviewedMEDICATIONS  (STANDING):  diltiazem    Tablet 30 milliGRAM(s) Oral every 6 hours  diltiazem Infusion 10 mG/Hr (10 mL/Hr) IV Continuous <Continuous>  doxycycline hyclate Capsule 100 milliGRAM(s) Oral every 12 hours  heparin  Infusion.  Unit(s)/Hr (20 mL/Hr) IV Continuous <Continuous>  influenza   Vaccine 0.5 milliLiter(s) IntraMuscular once  levothyroxine 25 MICROGram(s) Oral daily  oxybutynin 10 milliGRAM(s) Oral daily  tamsulosin 0.4 milliGRAM(s) Oral at bedtime     ROS: Pertinent positives in HPI, all other ROS were reviewed and are negative.      Examination:   Vital Signs Last 24 Hrs  T(C): 36.7 (2019 04:26), Max: 37.1 (2019 20:12)  T(F): 98 (2019 04:26), Max: 98.8 (2019 20:12)  HR: 70 (2019 04:26) (70 - 116)  BP: 110/65 (2019 04:26) (105/78 - 143/84)  BP(mean): --  RR: 18 (2019 03:31) (16 - 22)  SpO2: 98% (2019 04:26) (97% - 100%)  General: Cooperative, NAD   NECK: supple, no masses  ENT: Normal hearing   Vascular : no carotid bruits,   Lungs: CTAB  Chest: RRR, no murmurs  Extremities: nontender, no edema  Musculoskeletal: no adventitious movements, no joint stiffness  Skin: no rash    Neurological Examination:  NIHSS:0  MS: AOx3. Appropriately interactive, normal affect. Speech fluent w/o paraphasic error, repetition, naming, reading is intact   CN: VFFTC, PERLL, EOMI, V1-3 sensation intact, face symmetric, hearing intact, palate elevates symmetrically, tongue midline, SCM equal bilaterally  Motor: normal bulk and tone, no tremor, rigidity or bradykinesia.  5/5 all over   Sens: Intact to light touch.    Reflexes: 2/4 all over, downgoing toes b/l  Coord:  No dysmetria, THANG intact   Gait: normal    Labs:   Comprehensive Metabolic Panel in AM (19 @ 09:22)    Sodium, Serum: 145 mmol/L    Potassium, Serum: 4.1 mmol/L    Chloride, Serum: 117 mmol/L    Carbon Dioxide, Serum: 21 mmol/L    Anion Gap, Serum: 7 mmol/L    Blood Urea Nitrogen, Serum: 13 mg/dL    Creatinine, Serum: 1.25 mg/dL    Glucose, Serum: 97 mg/dL    Calcium, Total Serum: 8.8 mg/dL    Protein Total, Serum: 6.4 gm/dL    Albumin, Serum: 3.4 g/dL    Bilirubin Total, Serum: 1.8 mg/dL    Alkaline Phosphatase, Serum: 68 U/L    Aspartate Aminotransferase (AST/SGOT): 43 U/L    Alanine Aminotransferase (ALT/SGPT): 126 U/L    eGFR if Non : 62    Complete Blood Count (19 @ 01:13)    Nucleated RBC: 0 /100 WBCs    WBC Count: 7.15 K/uL    RBC Count: 5.26 M/uL    Hemoglobin: 15.5 g/dL    Hematocrit: 46.6 %    Mean Cell Volume: 88.6 fl    Mean Cell Hemoglobin: 29.5 pg    Mean Cell Hemoglobin Conc: 33.3 gm/dL    Red Cell Distrib Width: 12.6 %    Platelet Count - Automated: 149 K/uL    Imaging:   < from: CT Head No Cont (01.26.19 @ 18:17) >  IMPRESSION:     No acute intracranial bleeding, mass effect, or shift. Mild chronic   microvascular ischemic changes.

## 2019-01-27 NOTE — SWALLOW BEDSIDE ASSESSMENT ADULT - ASR SWALLOW LINGUAL MOBILITY
within functional limits/No tongue focality noted on exam./impaired right lateral movement within functional limits/No tongue focality noted on exam.

## 2019-01-27 NOTE — SWALLOW BEDSIDE ASSESSMENT ADULT - SWALLOW EVAL: DIAGNOSIS
1) The pt demonstrates Oropharyngeal Swallowing abilities which subjectively appear to be stable and within functional parameters for age. No behavioral aspiration signs exhibited on exam. Odynophagia was denied.   2) The pt was alert and interactive. He was somewhat anxious and tearful at times. the pt c/o a transient facial droop which precipitated this admission but now resolved. He is currently oriented x3+ and able to verbalize during communicative probes as well as in conversation without evidence of an overt primary motor speech or primary linguistic pathology. The pt is communicatively competent and reportedly at communicative baseline. 1) The pt demonstrates Oropharyngeal Swallowing abilities which subjectively appear to be stable and within functional parameters for age. No behavioral aspiration signs exhibited on exam. Odynophagia was denied.   2) The pt was alert and interactive. He was somewhat anxious and tearful at times. The pt c/o a transient facial droop which precipitated this admission but now resolved. He is currently oriented x3+ and able to verbalize during communicative probes as well as in conversation without evidence of an overt primary motor speech or primary linguistic pathology. The pt is communicatively competent and reportedly at communicative baseline.

## 2019-01-27 NOTE — H&P ADULT - ASSESSMENT
62 yo male presented with h/o AMS and Slurry speech today.    A/P:    1.  Altered mental status-resolved   TIA  A fib with RVR  -clinically better  -will continue to follow in telemetry  -follow troponin  -on IV Cardizem drip  -also started on PO Cardizem  -will wean off from Cardizem drip as tolerates  -on heparin drip    -follow neurology consult   -follow cardiology consult   -follow PT eval. speech eval  -follow echo report, carotid duplex report  -follow labs-A1C, lipid profile      2.  BPH  Hypothyroidism  -continue home meds   -follow TSH due to new onset A fib     3.  DVT ppx-on heparin drip     4.  Code status: Full code 62 yo male presented with h/o AMS and Slurry speech today.    A/P:    1.  Altered mental status-resolved   TIA  A fib with RVR  -clinically better  -will continue to follow in telemetry  -follow troponin  -on IV Cardizem drip  -also started on PO Cardizem  -will wean off from Cardizem drip as tolerates  -on heparin drip    -follow neurology consult   -follow cardiology consult   -follow PT eval. speech eval  -follow echo report, carotid duplex report  -follow labs-A1C, lipid profile      2.  BPH  Hypothyroidism  -continue home meds   -follow TSH due to new onset A fib     3.  Transaminitis  -follow CMP, hep panel, US abd    4.  CKD stage 3 vs Acute Kidney Injury  -will follow repeat labs at am  -follow US abd    5.  DVT ppx-on heparin drip     6.  Code status: Full code

## 2019-01-27 NOTE — H&P ADULT - NSHPPHYSICALEXAM_GEN_ALL_CORE
Vital Signs Last 24 Hrs  T(C): 37.1 (26 Jan 2019 20:12), Max: 37.1 (26 Jan 2019 20:12)  T(F): 98.8 (26 Jan 2019 20:12), Max: 98.8 (26 Jan 2019 20:12)  HR: 98 (26 Jan 2019 21:49) (77 - 116)  BP: 111/79 (26 Jan 2019 21:49) (105/78 - 140/85)  RR: 18 (26 Jan 2019 21:49) (16 - 22)  SpO2: 99% (26 Jan 2019 21:49) (99% - 100%)

## 2019-01-28 LAB
APTT BLD: 75.8 SEC — HIGH (ref 27.5–36.3)
APTT BLD: 84.3 SEC — HIGH (ref 27.5–36.3)
HCT VFR BLD CALC: 46.6 % — SIGNIFICANT CHANGE UP (ref 39–50)
HGB BLD-MCNC: 15.5 G/DL — SIGNIFICANT CHANGE UP (ref 13–17)
MCHC RBC-ENTMCNC: 29 PG — SIGNIFICANT CHANGE UP (ref 27–34)
MCHC RBC-ENTMCNC: 33.3 GM/DL — SIGNIFICANT CHANGE UP (ref 32–36)
MCV RBC AUTO: 87.3 FL — SIGNIFICANT CHANGE UP (ref 80–100)
NRBC # BLD: 0 /100 WBCS — SIGNIFICANT CHANGE UP (ref 0–0)
PLATELET # BLD AUTO: 155 K/UL — SIGNIFICANT CHANGE UP (ref 150–400)
RBC # BLD: 5.34 M/UL — SIGNIFICANT CHANGE UP (ref 4.2–5.8)
RBC # FLD: 12.9 % — SIGNIFICANT CHANGE UP (ref 10.3–14.5)
WBC # BLD: 6.55 K/UL — SIGNIFICANT CHANGE UP (ref 3.8–10.5)
WBC # FLD AUTO: 6.55 K/UL — SIGNIFICANT CHANGE UP (ref 3.8–10.5)

## 2019-01-28 PROCEDURE — 93306 TTE W/DOPPLER COMPLETE: CPT | Mod: 26

## 2019-01-28 PROCEDURE — 93880 EXTRACRANIAL BILAT STUDY: CPT | Mod: 26

## 2019-01-28 PROCEDURE — 70551 MRI BRAIN STEM W/O DYE: CPT | Mod: 26

## 2019-01-28 PROCEDURE — 76700 US EXAM ABDOM COMPLETE: CPT | Mod: 26

## 2019-01-28 RX ORDER — APIXABAN 2.5 MG/1
5 TABLET, FILM COATED ORAL EVERY 12 HOURS
Qty: 0 | Refills: 0 | Status: DISCONTINUED | OUTPATIENT
Start: 2019-01-28 | End: 2019-01-30

## 2019-01-28 RX ORDER — DIAZEPAM 5 MG
2 TABLET ORAL ONCE
Qty: 0 | Refills: 0 | Status: DISCONTINUED | OUTPATIENT
Start: 2019-01-28 | End: 2019-01-28

## 2019-01-28 RX ORDER — METOPROLOL TARTRATE 50 MG
25 TABLET ORAL ONCE
Qty: 0 | Refills: 0 | Status: COMPLETED | OUTPATIENT
Start: 2019-01-28 | End: 2019-01-28

## 2019-01-28 RX ORDER — METOPROLOL TARTRATE 50 MG
100 TABLET ORAL
Qty: 0 | Refills: 0 | Status: DISCONTINUED | OUTPATIENT
Start: 2019-01-28 | End: 2019-01-29

## 2019-01-28 RX ORDER — FUROSEMIDE 40 MG
20 TABLET ORAL ONCE
Qty: 0 | Refills: 0 | Status: COMPLETED | OUTPATIENT
Start: 2019-01-28 | End: 2019-01-28

## 2019-01-28 RX ORDER — METOPROLOL TARTRATE 50 MG
50 TABLET ORAL
Qty: 0 | Refills: 0 | Status: DISCONTINUED | OUTPATIENT
Start: 2019-01-28 | End: 2019-01-28

## 2019-01-28 RX ADMIN — APIXABAN 5 MILLIGRAM(S): 2.5 TABLET, FILM COATED ORAL at 18:14

## 2019-01-28 RX ADMIN — HEPARIN SODIUM 1600 UNIT(S)/HR: 5000 INJECTION INTRAVENOUS; SUBCUTANEOUS at 08:00

## 2019-01-28 RX ADMIN — Medication 100 MILLIGRAM(S): at 18:31

## 2019-01-28 RX ADMIN — Medication 20 MILLIGRAM(S): at 18:14

## 2019-01-28 RX ADMIN — Medication 1 TABLET(S): at 18:14

## 2019-01-28 RX ADMIN — Medication 25 MILLIGRAM(S): at 05:31

## 2019-01-28 RX ADMIN — Medication 10 MILLIGRAM(S): at 12:33

## 2019-01-28 RX ADMIN — TAMSULOSIN HYDROCHLORIDE 0.4 MILLIGRAM(S): 0.4 CAPSULE ORAL at 21:01

## 2019-01-28 RX ADMIN — Medication 100 MILLIGRAM(S): at 05:31

## 2019-01-28 RX ADMIN — Medication 2 MILLIGRAM(S): at 09:31

## 2019-01-28 RX ADMIN — HEPARIN SODIUM 1600 UNIT(S)/HR: 5000 INJECTION INTRAVENOUS; SUBCUTANEOUS at 01:15

## 2019-01-28 RX ADMIN — Medication 25 MILLIGRAM(S): at 12:33

## 2019-01-28 RX ADMIN — Medication 25 MICROGRAM(S): at 05:31

## 2019-01-28 NOTE — PROGRESS NOTE ADULT - SUBJECTIVE AND OBJECTIVE BOX
64 yo male presents to the ED  with complain of episode of AMS today. Patient was at a party today when he suddenly began having slurred speech, facial droop, and diaphoresis. he also had +urinary incontinence. Episode lasted approximately 5 minutes, and then symptoms completely resolved. No LOC. Then he started to have SOB. He still felt SOB during his presentation in ED. As per patient, he has been "not feeling well" for a month, with palpitations and SOB described as "throat tightness." Patient was seen by PMD, was prescribed Augmentin for possible sinus infection, but with no relief of SOB. Patient has been taking Tylenol PM for symptoms and for sleep aid. Patient also has a cyst on his abd, for which he is on abx, doxycycline. Denies tobacco use, illicit drug use. Pt drank 1.5 glasses of wine at party today. Patient has been on a diet to lose weight, has decreased his portion sizes and decreased sugary and fatty foods in his diet, does not eat red meat. Exercises 3-4 days per week.    1.28: no distress            REVIEW OF SYSTEMS:    CONSTITUTIONAL: No weakness, No fevers or chills  ENT: No ear ache, No sorethroat  NECK: No pain, No stiffness  RESPIRATORY: No cough, No wheezing, No hemoptysis; No dyspnea  CARDIOVASCULAR: No chest pain, No palpitations  GASTROINTESTINAL: No abd pain, No nausea, No vomiting, No hematemesis, No diarrhea or constipation. No melena, No hematochezia.  GENITOURINARY: No dysuria, No  hematuria  NEUROLOGICAL: No diplopia, No paresthesia, No motor dysfunction  MUSCULOSKELETAL: No arthralgia, No myalgia  SKIN: No rashes, or lesions   PSYCH: no anxiety, no suicidal ideation    All other review of systems is negative unless indicated above    Vital Signs Last 24 Hrs  T(C): 36.7 (28 Jan 2019 12:32), Max: 36.8 (27 Jan 2019 17:14)  T(F): 98 (28 Jan 2019 12:32), Max: 98.2 (27 Jan 2019 17:14)  HR: 120 (28 Jan 2019 12:32) (72 - 120)  BP: 125/94 (28 Jan 2019 12:32) (106/59 - 125/94)  BP(mean): --  RR: 18 (28 Jan 2019 12:32) (18 - 18)  SpO2: 99% (28 Jan 2019 12:32) (96% - 99%)    PHYSICAL EXAM:    GENERAL: NAD, Well nourished  HEENT:  NC/AT, EOMI, PERRLA, No scleral icterus, Moist mucous membranes  NECK: Supple, No JVD  CNS:  Alert & Oriented X3, Motor Strength 5/5 B/L upper and lower extremities; DTRs 2+ intact   LUNG: Normal Breath sounds, Clear to auscultation bilaterally, No rales, No rhonchi, No wheezing  HEART: RRR; No murmurs, No rubs  ABDOMEN: +BS, ST/ND/NT  GENITOURINARY: Voiding, Bladder not distended  EXTREMITIES:  2+ Peripheral Pulses, No clubbing, No cyanosis, No tibial edema  MUSCULOSKELTAL: Joints normal ROM, No TTP, No effusion  VAGINAL: deferred  SKIN: no rashes  RECTAL: deferred, not indicated  BREAST: deferred                          15.5   6.55  )-----------( 155      ( 28 Jan 2019 06:55 )             46.6     01-27    145  |  117<H>  |  13  ----------------------------<  97  4.1   |  21<L>  |  1.25    Ca    8.8      27 Jan 2019 09:22  Mg     1.9     01-26    TPro  6.4  /  Alb  3.4  /  TBili  1.8<H>  /  DBili  x   /  AST  43<H>  /  ALT  126<H>  /  AlkPhos  68  01-27    Vancomycin levels:   Cultures:     MEDICATIONS  (STANDING):  diltiazem    Tablet 30 milliGRAM(s) Oral every 6 hours  diltiazem Infusion 10 mG/Hr (10 mL/Hr) IV Continuous <Continuous>  doxycycline hyclate Capsule 100 milliGRAM(s) Oral every 12 hours  heparin  Infusion.  Unit(s)/Hr (20 mL/Hr) IV Continuous <Continuous>  influenza   Vaccine 0.5 milliLiter(s) IntraMuscular once  levothyroxine 25 MICROGram(s) Oral daily  metoprolol tartrate 50 milliGRAM(s) Oral two times a day  oxybutynin 10 milliGRAM(s) Oral daily  tamsulosin 0.4 milliGRAM(s) Oral at bedtime    MEDICATIONS  (PRN):  heparin  Injectable 9000 Unit(s) IV Push every 6 hours PRN For aPTT less than 40  heparin  Injectable 4500 Unit(s) IV Push every 6 hours PRN For aPTT between 40 - 57      all labs reviewed  all imaging reviewed    Assessment and Plan:    1. 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 Afib: not controlled  increase Lopressor as needed  Cardizem 30mg q6hrs  anticoagulation with Elequis  Echo pending    3. Abd cyst:  cw Doxycycline

## 2019-01-28 NOTE — PROGRESS NOTE ADULT - ASSESSMENT
Slurred speech- Appreciate neurology team input.  MRI pending.  Anticoagulation management as stated below.  Will hold off MIHIR pending 2 D echo findings since pt already on full dose anticoagulation.     Afib with RVR -overnight one episode of HR 30/min  Will increase metoprolol and down titrate the cardizem drip with goal HR 80/min.  Full dose anticoagulation to continue.  check 2  dechocardiogram.  Will need outpt sleep study and ischemia workup.    HTN- continue current meds.  BP borderline low normal.    Hyperlipdiemia- statin     Other medical issues- Management per primary team.  Thank you for allowing me to participate in the care of this patient. Please feel free to contact me with any questions.

## 2019-01-28 NOTE — PROGRESS NOTE ADULT - SUBJECTIVE AND OBJECTIVE BOX
Patient is a 63y old  Male who presents with a chief complaint of complain of slurry speech and altered mental status.       HPI:  64 yo male presents to the ED  with complain of episode of AMS today. Patient was at a party today when he suddenly began having slurred speech, facial droop, and diaphoresis. he also had +urinary incontinence. Episode lasted approximately 5 minutes, and then symptoms completely resolved. No LOC.  During the hosptial course he was noted to have afib wtih RVR and was started on cardizem drip.  He was also started on heparin drip for anticoagulaiton.      Family Hx:  Mother: Alive, 91 yr, had colon cancer, DM, Aortic valve replacement  Father:  from Stomach cancer    PMHx:  BPH  Hypothyroidism     PSHx:  No recent surgeries. (2019 00:15)      PAST MEDICAL & SURGICAL HISTORY:  HLD (hyperlipidemia)  Hypercholesterolemia: controlled by diet and exercise  HTN - Hypertension: borderline controlled by diet/  exercise  Left Ear Excision of Cholesteotoma:   Left Inguinal Hernia Repair: @ 8 years old      MEDICATIONS  (STANDING):  diazepam    Tablet 2 milliGRAM(s) Oral once  diltiazem    Tablet 30 milliGRAM(s) Oral every 6 hours  diltiazem Infusion 10 mG/Hr (10 mL/Hr) IV Continuous <Continuous>  doxycycline hyclate Capsule 100 milliGRAM(s) Oral every 12 hours  heparin  Infusion.  Unit(s)/Hr (20 mL/Hr) IV Continuous <Continuous>  influenza   Vaccine 0.5 milliLiter(s) IntraMuscular once  levothyroxine 25 MICROGram(s) Oral daily  metoprolol tartrate 25 milliGRAM(s) Oral two times a day  oxybutynin 10 milliGRAM(s) Oral daily  tamsulosin 0.4 milliGRAM(s) Oral at bedtime    MEDICATIONS  (PRN):  heparin  Injectable 9000 Unit(s) IV Push every 6 hours PRN For aPTT less than 40  heparin  Injectable 4500 Unit(s) IV Push every 6 hours PRN For aPTT between 40 - 57      FAMILY HISTORY:      SOCIAL HISTORY:    REVIEW OF SYSTEMS:  CONSTITUTIONAL:    No fatigue, malaise, lethargy.  No fever or chills.  HEENT:  Eyes:  No visual changes.     ENT:  No epistaxis.  No sinus pain.    RESPIRATORY:  No cough.  No wheeze.  No hemoptysis.  No shortness of breath.  CARDIOVASCULAR:  No chest pains.  No palpitations. No shortness of breath, No orthopnea or PND.  GASTROINTESTINAL:  No abdominal pain.  No nausea or vomiting.    GENITOURINARY:    No hematuria.    MUSCULOSKELETAL:  No musculoskeletal pain.  No joint swelling.  No arthritis.  NEUROLOGICAL:  No tingling or numbness or weakness.  PSYCHIATRIC:  No confusion  SKIN:  No rashes.    ENDOCRINE:  No unexplained weight loss.  No polydipsia.   HEMATOLOGIC:  No anemia.  No prolonged or excessive bleeding.   ALLERGIC AND IMMUNOLOGIC:  No pruritus.          Vital Signs Last 24 Hrs  T(C): 36.7 (2019 04:36), Max: 36.8 (2019 17:14)  T(F): 98 (2019 04:36), Max: 98.2 (2019 17:14)  HR: 72 (2019 04:36) (72 - 117)  BP: 108/70 (2019 04:36) (106/59 - 121/73)  BP(mean): --  RR: 18 (2019 17:14) (18 - 18)  SpO2: 98% (2019 04:36) (96% - 99%)    PHYSICAL EXAM-    Constitutional: no acute distres    Head: Head is normocephalic and atraumatic.      Neck:  There are strong carotid pulses bilaterally. No JVD.     Cardiovascular: irregular rate and tachycardic.      Respiratory: Breathsounds are normal. No rales. No wheezing.    Abdomen: Soft, nontender, nondistended with positive bowel sounds.      Extremity: No tenderness. No  pitting edema     Neurologic: The patient is alert and oriented.      Skin: No rash, no obvious lesions noted.      Psychiatric: The patient appears to be emotionally stable.      INTERPRETATION OF TELEMETRY: afib 100/min    ECG:     I&O's Detail    2019 07:01  -  2019 07:00  --------------------------------------------------------  IN:    diltiazem Infusion: 248 mL    heparin  Infusion.: 387.9 mL  Total IN: 635.9 mL    OUT:  Total OUT: 0 mL    Total NET: 635.9 mL          LABS:                        15.5   6.55  )-----------( 155      ( 2019 06:55 )             46.6         145  |  117<H>  |  13  ----------------------------<  97  4.1   |  21<L>  |  1.25    Ca    8.8      2019 09:22  Mg     1.9         TPro  6.4  /  Alb  3.4  /  TBili  1.8<H>  /  DBili  x   /  AST  43<H>  /  ALT  126<H>  /  AlkPhos  68      CARDIAC MARKERS ( 2019 09:22 )  0.040 ng/mL / x     / x     / x     / x      CARDIAC MARKERS ( 2019 17:53 )  0.029 ng/mL / x     / x     / x     / x          PT/INR - ( 2019 17:53 )   PT: 16.5 sec;   INR: 1.47 ratio         PTT - ( 2019 06:56 )  PTT:84.3 sec  Urinalysis Basic - ( 2019 19:30 )    Color: Yellow / Appearance: Clear / S.025 / pH: x  Gluc: x / Ketone: Negative  / Bili: Negative / Urobili: Negative mg/dL   Blood: x / Protein: 15 mg/dL / Nitrite: Negative   Leuk Esterase: Trace / RBC: Negative /HPF / WBC 3-5   Sq Epi: x / Non Sq Epi: Occasional / Bacteria: Negative      I&O's Summary    2019 07:01  -  2019 07:00  --------------------------------------------------------  IN: 635.9 mL / OUT: 0 mL / NET: 635.9 mL      BNP  RADIOLOGY & ADDITIONAL STUDIES:  < from: CT Head No Cont (19 @ 18:17) >  IMPRESSION:     No acute intracranial bleeding, mass effect, or shift. Mild chronic   microvascular ischemic changes.                 NOEMI PEREIRA   This document has been electronically signed. 2019  6:26PM    < end of copied text >

## 2019-01-29 DIAGNOSIS — I63.40 CEREBRAL INFARCTION DUE TO EMBOLISM OF UNSPECIFIED CEREBRAL ARTERY: ICD-10-CM

## 2019-01-29 LAB
APTT BLD: 33.3 SEC — SIGNIFICANT CHANGE UP (ref 27.5–36.3)
HCT VFR BLD CALC: 44.5 % — SIGNIFICANT CHANGE UP (ref 39–50)
HGB BLD-MCNC: 14.7 G/DL — SIGNIFICANT CHANGE UP (ref 13–17)
MCHC RBC-ENTMCNC: 29.3 PG — SIGNIFICANT CHANGE UP (ref 27–34)
MCHC RBC-ENTMCNC: 33 GM/DL — SIGNIFICANT CHANGE UP (ref 32–36)
MCV RBC AUTO: 88.6 FL — SIGNIFICANT CHANGE UP (ref 80–100)
NRBC # BLD: 0 /100 WBCS — SIGNIFICANT CHANGE UP (ref 0–0)
PLATELET # BLD AUTO: 133 K/UL — LOW (ref 150–400)
RBC # BLD: 5.02 M/UL — SIGNIFICANT CHANGE UP (ref 4.2–5.8)
RBC # FLD: 12.8 % — SIGNIFICANT CHANGE UP (ref 10.3–14.5)
WBC # BLD: 5.93 K/UL — SIGNIFICANT CHANGE UP (ref 3.8–10.5)
WBC # FLD AUTO: 5.93 K/UL — SIGNIFICANT CHANGE UP (ref 3.8–10.5)

## 2019-01-29 RX ORDER — METOPROLOL TARTRATE 50 MG
50 TABLET ORAL EVERY 8 HOURS
Qty: 0 | Refills: 0 | Status: DISCONTINUED | OUTPATIENT
Start: 2019-01-29 | End: 2019-01-30

## 2019-01-29 RX ORDER — METOPROLOL TARTRATE 50 MG
5 TABLET ORAL EVERY 6 HOURS
Qty: 0 | Refills: 0 | Status: DISCONTINUED | OUTPATIENT
Start: 2019-01-29 | End: 2019-02-09

## 2019-01-29 RX ORDER — LISINOPRIL 2.5 MG/1
2.5 TABLET ORAL DAILY
Qty: 0 | Refills: 0 | Status: DISCONTINUED | OUTPATIENT
Start: 2019-01-29 | End: 2019-01-30

## 2019-01-29 RX ORDER — ASPIRIN/CALCIUM CARB/MAGNESIUM 324 MG
81 TABLET ORAL DAILY
Qty: 0 | Refills: 0 | Status: DISCONTINUED | OUTPATIENT
Start: 2019-01-29 | End: 2019-02-09

## 2019-01-29 RX ORDER — POTASSIUM CHLORIDE 20 MEQ
40 PACKET (EA) ORAL ONCE
Qty: 0 | Refills: 0 | Status: COMPLETED | OUTPATIENT
Start: 2019-01-29 | End: 2019-01-29

## 2019-01-29 RX ORDER — FUROSEMIDE 40 MG
20 TABLET ORAL ONCE
Qty: 0 | Refills: 0 | Status: COMPLETED | OUTPATIENT
Start: 2019-01-29 | End: 2019-01-29

## 2019-01-29 RX ADMIN — Medication 25 MICROGRAM(S): at 05:19

## 2019-01-29 RX ADMIN — LISINOPRIL 2.5 MILLIGRAM(S): 2.5 TABLET ORAL at 11:32

## 2019-01-29 RX ADMIN — APIXABAN 5 MILLIGRAM(S): 2.5 TABLET, FILM COATED ORAL at 18:31

## 2019-01-29 RX ADMIN — TAMSULOSIN HYDROCHLORIDE 0.4 MILLIGRAM(S): 0.4 CAPSULE ORAL at 23:33

## 2019-01-29 RX ADMIN — Medication 40 MILLIEQUIVALENT(S): at 18:44

## 2019-01-29 RX ADMIN — Medication 50 MILLIGRAM(S): at 20:19

## 2019-01-29 RX ADMIN — Medication 5 MILLIGRAM(S): at 19:56

## 2019-01-29 RX ADMIN — Medication 20 MILLIGRAM(S): at 18:30

## 2019-01-29 RX ADMIN — Medication 100 MILLIGRAM(S): at 05:19

## 2019-01-29 RX ADMIN — Medication 81 MILLIGRAM(S): at 11:32

## 2019-01-29 RX ADMIN — Medication 1 TABLET(S): at 05:19

## 2019-01-29 RX ADMIN — Medication 10 MILLIGRAM(S): at 11:32

## 2019-01-29 RX ADMIN — Medication 1 TABLET(S): at 18:32

## 2019-01-29 RX ADMIN — APIXABAN 5 MILLIGRAM(S): 2.5 TABLET, FILM COATED ORAL at 05:19

## 2019-01-29 NOTE — PROGRESS NOTE ADULT - ASSESSMENT
63 year old man with transient dysarthria, facial droop, recovered. + MR of head for CVA. A-fib, on apixaban.

## 2019-01-29 NOTE — PROGRESS NOTE ADULT - SUBJECTIVE AND OBJECTIVE BOX
62 yo male presents to the ED, after transient episode of slurred speech, facial weakness. Symptoms resolved, with no sequelae. Denies associated weakness, numbness of the extremities, dizziness, loss of vision no double vision, no headache.      MEDICATIONS  (STANDING):  apixaban 5 milliGRAM(s) Oral every 12 hours  aspirin  chewable 81 milliGRAM(s) Oral daily  furosemide   Injectable 20 milliGRAM(s) IV Push once  influenza   Vaccine 0.5 milliLiter(s) IntraMuscular once  levothyroxine 25 MICROGram(s) Oral daily  lisinopril 2.5 milliGRAM(s) Oral daily  oxybutynin 10 milliGRAM(s) Oral daily  potassium chloride    Tablet ER 40 milliEquivalent(s) Oral once  tamsulosin 0.4 milliGRAM(s) Oral at bedtime  trimethoprim  160 mG/sulfamethoxazole 800 mG 1 Tablet(s) Oral two times a day      Neurological Exam:  HF: Patient is alert and oriented x 3. There is no aphasia or dysarthria. Follows complex commands.   CN: Vision is intact to confrontation. Pupils are equal and reactive. Extra ocular muscles are intact. There is no facial droop or asymmetry. Tongue is midline. Sensation is intact in the face. Other CN II-XII are intact.   Motor: motor examination all muscles are 5/5.   Sensory: intact to touch.   DTR: 2/4 all 4 extremities.   Co-ord:  Finger to finger to nose is intact.     < from: MR Head No Cont (01.28.19 @ 10:16) >  IMPRESSION:    small subcentimeter foci of restricted diffusion in the   RIGHT insular cortex and RIGHT parietal cortex stent with acute   infarctions. Bilateral mastoiditis is noted.    < from: US Duplex Carotid Arteries Complete, Bilateral (01.28.19 @ 10:49) >  IMPRESSION: No significant plaque burden or hemodynamically significant   stenosis.    < end of copied text >

## 2019-01-29 NOTE — PROGRESS NOTE ADULT - ASSESSMENT
Slurred speech- Appreciate neurology team input.  MRI results noted.  He was noted to have small foci consistent with acute infarcts per MRI report.  Likely embolic in nature  Anticoagulation management as stated below.  Pt is on full dose anticoauglation now. Will hold off on MIHIR for now.     Afib with RVR -rate controlled this am.  WIll DC cardizem and rate control agents as stated below.  Full dose anticoagulation to continue.     Newly diagnosed HFrEF- LVEF 18%. Dilated cardiomyopathy with wall motion abnormalities as stated above.  Will arrange for  and [facility] tomorrow.  Hold Elliquis tonight.   Discussed the findings and plan with pt at length.   Will start him on GDMT for HFrEF.  Toprol xl and lisinopril.   Appears euvolemic on exam.       HTN- meds as stated above.     Hyperlipdiemia- statin     Other medical issues- Management per primary team.  Thank you for allowing me to participate in the care of this patient. Please feel free to contact me with any questions.

## 2019-01-29 NOTE — PROGRESS NOTE ADULT - SUBJECTIVE AND OBJECTIVE BOX
Patient is a 63y old  Male who presents with a chief complaint of complain of slurry speech and altered mental status.       HPI:  64 yo male presents to the ED  with complain of episode of AMS today. Patient was at a party today when he suddenly began having slurred speech, facial droop, and diaphoresis. he also had +urinary incontinence. Episode lasted approximately 5 minutes, and then symptoms completely resolved. No LOC.  During the hosptial course he was noted to have afib wtih RVR and was started on cardizem drip.  He was also started on heparin drip for anticoagulation    - pt seen and examined by me today. Pt denies any new symptoms today.       Family Hx:  Mother: Alive, 91 yr, had colon cancer, DM, Aortic valve replacement  Father:  from Stomach cancer    PMHx:  BPH  Hypothyroidism     PSHx:  No recent surgeries. (2019 00:15)      PAST MEDICAL & SURGICAL HISTORY:  HLD (hyperlipidemia)  Hypercholesterolemia: controlled by diet and exercise  HTN - Hypertension: borderline controlled by diet/  exercise  Left Ear Excision of Cholesteotoma:   Left Inguinal Hernia Repair: @ 8 years old      MEDICATIONS  (STANDING):  diazepam    Tablet 2 milliGRAM(s) Oral once  diltiazem    Tablet 30 milliGRAM(s) Oral every 6 hours  diltiazem Infusion 10 mG/Hr (10 mL/Hr) IV Continuous <Continuous>  doxycycline hyclate Capsule 100 milliGRAM(s) Oral every 12 hours  heparin  Infusion.  Unit(s)/Hr (20 mL/Hr) IV Continuous <Continuous>  influenza   Vaccine 0.5 milliLiter(s) IntraMuscular once  levothyroxine 25 MICROGram(s) Oral daily  metoprolol tartrate 25 milliGRAM(s) Oral two times a day  oxybutynin 10 milliGRAM(s) Oral daily  tamsulosin 0.4 milliGRAM(s) Oral at bedtime    MEDICATIONS  (PRN):  heparin  Injectable 9000 Unit(s) IV Push every 6 hours PRN For aPTT less than 40  heparin  Injectable 4500 Unit(s) IV Push every 6 hours PRN For aPTT between 40 - 57      FAMILY HISTORY:      SOCIAL HISTORY:    REVIEW OF SYSTEMS:  CONSTITUTIONAL:    No fatigue, malaise, lethargy.  No fever or chills.  HEENT:  Eyes:  No visual changes.     ENT:  No epistaxis.  No sinus pain.    RESPIRATORY:  No cough.  No wheeze.  No hemoptysis.  No shortness of breath.  CARDIOVASCULAR:  No chest pains.  No palpitations. No shortness of breath, No orthopnea or PND.  GASTROINTESTINAL:  No abdominal pain.  No nausea or vomiting.    GENITOURINARY:    No hematuria.    MUSCULOSKELETAL:  No musculoskeletal pain.  No joint swelling.  No arthritis.  NEUROLOGICAL:  No tingling or numbness or weakness.  PSYCHIATRIC:  No confusion  SKIN:  No rashes.    ENDOCRINE:  No unexplained weight loss.  No polydipsia.   HEMATOLOGIC:  No anemia.  No prolonged or excessive bleeding.   ALLERGIC AND IMMUNOLOGIC:  No pruritus.          Vital Signs Last 24 Hrs  T(C): 36.7 (2019 04:36), Max: 36.8 (2019 17:14)  T(F): 98 (2019 04:36), Max: 98.2 (2019 17:14)  HR: 72 (2019 04:36) (72 - 117)  BP: 108/70 (2019 04:36) (106/59 - 121/73)  BP(mean): --  RR: 18 (2019 17:14) (18 - 18)  SpO2: 98% (2019 04:36) (96% - 99%)    PHYSICAL EXAM-    Constitutional: no acute distres    Head: Head is normocephalic and atraumatic.      Neck:  There are strong carotid pulses bilaterally. No JVD.     Cardiovascular: irregular rate and tachycardic.      Respiratory: Breathsounds are normal. No rales. No wheezing.    Abdomen: Soft, nontender, nondistended with positive bowel sounds.      Extremity: No tenderness. No  pitting edema     Neurologic: The patient is alert and oriented.      Skin: No rash, no obvious lesions noted.      Psychiatric: The patient appears to be emotionally stable.      INTERPRETATION OF TELEMETRY: afib 100/min    ECG:     I&O's Detail    2019 07:01  -  2019 07:00  --------------------------------------------------------  IN:    diltiazem Infusion: 248 mL    heparin  Infusion.: 387.9 mL  Total IN: 635.9 mL    OUT:  Total OUT: 0 mL    Total NET: 635.9 mL          LABS:                        15.5   6.55  )-----------( 155      ( 2019 06:55 )             46.6         145  |  117<H>  |  13  ----------------------------<  97  4.1   |  21<L>  |  1.25    Ca    8.8      2019 09:22  Mg     1.9         TPro  6.4  /  Alb  3.4  /  TBili  1.8<H>  /  DBili  x   /  AST  43<H>  /  ALT  126<H>  /  AlkPhos  68      CARDIAC MARKERS ( 2019 09:22 )  0.040 ng/mL / x     / x     / x     / x      CARDIAC MARKERS ( 2019 17:53 )  0.029 ng/mL / x     / x     / x     / x          PT/INR - ( 2019 17:53 )   PT: 16.5 sec;   INR: 1.47 ratio         PTT - ( 2019 06:56 )  PTT:84.3 sec  Urinalysis Basic - ( 2019 19:30 )    Color: Yellow / Appearance: Clear / S.025 / pH: x  Gluc: x / Ketone: Negative  / Bili: Negative / Urobili: Negative mg/dL   Blood: x / Protein: 15 mg/dL / Nitrite: Negative   Leuk Esterase: Trace / RBC: Negative /HPF / WBC 3-5   Sq Epi: x / Non Sq Epi: Occasional / Bacteria: Negative      I&O's Summary    2019 07:01  -  2019 07:00  --------------------------------------------------------  IN: 635.9 mL / OUT: 0 mL / NET: 635.9 mL      BNP  RADIOLOGY & ADDITIONAL STUDIES:  < from: CT Head No Cont (19 @ 18:17) >  IMPRESSION:     No acute intracranial bleeding, mass effect, or shift. Mild chronic   microvascular ischemic changes.                 NOEMI PEREIRA   This document has been electronically signed. 2019  6:26PM    < end of copied text >  < from: Transthoracic Echocardiogram (19 @ 12:02) >   Summary     The left ventricle cavity is mildly dilated. Left ventricle systolic   function appears severely impaired globally along with segmental wall   motion abnormalities noted. Severe hypokinesis of the inferior,   inferoseptum, anteroseptum and anterior walls. The apical cap is   hypokinetic and free of thrombus. Estimated ejection fraction is 18% via   bi-plane method.   The left atrium is moderately dilated.   The right atrium appears borderline dilated.   The right ventricle is borderline dilated with mildly impaired function.   The aortic valve is well visualized, appears mildly sclerotic. Aortic   valve leaflet excursion is preserved.   Trace aortic regurgitation is present.   The mitral valve leaflets are well seen thin and pliable; preserved   leaflet excursion noted. Trace to mild mitral valve regurgitation noted.   Redundant chordae noted.   EA reversal of the mitral inflow consistent with reduced compliance of   the   left ventricle (When appreciable.)   The tricuspid valve leaflets are well seen and appear thin and pliable   with preserved leaflets excursion. Trace tricuspid regurgitation noted.   The pulmonic valve leaflets appear thin and pliable. Trace to mild   pulmonic regurgitation noted.   No evidence of pericardial effusion.   No evidence of pleural effusion.     Signature     ----------------------------------------------------------------   Electronically signed by Mark Dueñas MD(Interpreting   physician) on 2019 05:48 PM   ----------------------------------------------------------------    < end of copied text >  < from: MR Head No Cont (19 @ 10:16) >  IMPRESSION:    small subcentimeter foci of restricted diffusion in the   RIGHT insular cortex and RIGHT parietal cortex stent with acute   infarctions. Bilateral mastoiditis is noted.                ELSY HATCH M.D., ATTENDING RADIOLOGIST  This document has been electronically signed. 2019 10:42AM    < end of copied text >

## 2019-01-29 NOTE — PROGRESS NOTE ADULT - SUBJECTIVE AND OBJECTIVE BOX
64 yo male presents to the ED  with complain of episode of AMS today. Patient was at a party today when he suddenly began having slurred speech, facial droop, and diaphoresis. he also had +urinary incontinence. Episode lasted approximately 5 minutes, and then symptoms completely resolved. No LOC. Then he started to have SOB. He still felt SOB during his presentation in ED. As per patient, he has been "not feeling well" for a month, with palpitations and SOB described as "throat tightness." Patient was seen by PMD, was prescribed Augmentin for possible sinus infection, but with no relief of SOB. Patient has been taking Tylenol PM for symptoms and for sleep aid. Patient also has a cyst on his abd, for which he is on abx, doxycycline. Denies tobacco use, illicit drug use. Pt drank 1.5 glasses of wine at party today. Patient has been on a diet to lose weight, has decreased his portion sizes and decreased sugary and fatty foods in his diet, does not eat red meat. Exercises 3-4 days per week.    1.28: no distress  1.29: no distress, less dyspnea after IV Lasix          REVIEW OF SYSTEMS:    CONSTITUTIONAL: No weakness, No fevers or chills  ENT: No ear ache, No sorethroat  NECK: No pain, No stiffness  RESPIRATORY: No cough, No wheezing, No hemoptysis; No dyspnea  CARDIOVASCULAR: No chest pain, No palpitations  GASTROINTESTINAL: No abd pain, No nausea, No vomiting, No hematemesis, No diarrhea or constipation. No melena, No hematochezia.  GENITOURINARY: No dysuria, No  hematuria  NEUROLOGICAL: No diplopia, No paresthesia, No motor dysfunction  MUSCULOSKELETAL: No arthralgia, No myalgia  SKIN: No rashes, or lesions   PSYCH: no anxiety, no suicidal ideation    All other review of systems is negative unless indicated above    Vital Signs Last 24 Hrs  T(C): 36.5 (29 Jan 2019 11:01), Max: 36.7 (29 Jan 2019 00:05)  T(F): 97.7 (29 Jan 2019 11:01), Max: 98.1 (29 Jan 2019 00:05)  HR: 96 (29 Jan 2019 11:01) (79 - 96)  BP: 110/59 (29 Jan 2019 11:01) (90/47 - 134/87)  BP(mean): --  RR: 16 (29 Jan 2019 11:01) (16 - 18)  SpO2: 99% (29 Jan 2019 11:01) (95% - 99%)    PHYSICAL EXAM:    GENERAL: NAD, Well nourished  HEENT:  NC/AT, EOMI, PERRLA, No scleral icterus, Moist mucous membranes  NECK: Supple, No JVD  CNS:  Alert & Oriented X3, Motor Strength 5/5 B/L upper and lower extremities; DTRs 2+ intact   LUNG: Normal Breath sounds, Clear to auscultation bilaterally, No rales, No rhonchi, No wheezing  HEART: RRR; No murmurs, No rubs  ABDOMEN: +BS, ST/ND/NT  GENITOURINARY: Voiding, Bladder not distended  EXTREMITIES:  2+ Peripheral Pulses, No clubbing, No cyanosis, +1 tibial edema  MUSCULOSKELTAL: Joints normal ROM, No TTP, No effusion  SKIN: no rashes  RECTAL: deferred, not indicated  BREAST: deferred                          15.5   6.55  )-----------( 155      ( 28 Jan 2019 06:55 )             46.6     01-27    145  |  117<H>  |  13  ----------------------------<  97  4.1   |  21<L>  |  1.25    Ca    8.8      27 Jan 2019 09:22  Mg     1.9     01-26    TPro  6.4  /  Alb  3.4  /  TBili  1.8<H>  /  DBili  x   /  AST  43<H>  /  ALT  126<H>  /  AlkPhos  68  01-27    Vancomycin levels:   Cultures:     MEDICATIONS  (STANDING):  apixaban 5 milliGRAM(s) Oral every 12 hours  aspirin  chewable 81 milliGRAM(s) Oral daily  influenza   Vaccine 0.5 milliLiter(s) IntraMuscular once  levothyroxine 25 MICROGram(s) Oral daily  lisinopril 2.5 milliGRAM(s) Oral daily  oxybutynin 10 milliGRAM(s) Oral daily  tamsulosin 0.4 milliGRAM(s) Oral at bedtime  trimethoprim  160 mG/sulfamethoxazole 800 mG 1 Tablet(s) Oral two times a day        all labs reviewed  all imaging reviewed    Assessment and Plan:    1. 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 Afib: not controlled  increase bblockers as needed  Cardizem CI due to low EF  anticoagulation with Elequis    3. Abd cyst:  cw Bactrim    4. Acute systolic L Chf:  Lasix 40mg IV x 1 today  c/w ACEinh  c/w BBlockers  ischemic eval per cardiology

## 2019-01-30 LAB
ADD ON TEST-SPECIMEN IN LAB: SIGNIFICANT CHANGE UP
ANION GAP SERPL CALC-SCNC: 7 MMOL/L — SIGNIFICANT CHANGE UP (ref 5–17)
APTT BLD: 35.9 SEC — SIGNIFICANT CHANGE UP (ref 27.5–36.3)
BUN SERPL-MCNC: 18 MG/DL — SIGNIFICANT CHANGE UP (ref 7–23)
CALCIUM SERPL-MCNC: 8.6 MG/DL — SIGNIFICANT CHANGE UP (ref 8.5–10.1)
CHLORIDE SERPL-SCNC: 113 MMOL/L — HIGH (ref 96–108)
CO2 SERPL-SCNC: 24 MMOL/L — SIGNIFICANT CHANGE UP (ref 22–31)
CREAT SERPL-MCNC: 1.25 MG/DL — SIGNIFICANT CHANGE UP (ref 0.5–1.3)
GLUCOSE SERPL-MCNC: 70 MG/DL — SIGNIFICANT CHANGE UP (ref 70–99)
HCT VFR BLD CALC: 45.5 % — SIGNIFICANT CHANGE UP (ref 39–50)
HCT VFR BLD CALC: 45.8 % — SIGNIFICANT CHANGE UP (ref 39–50)
HGB BLD-MCNC: 15.3 G/DL — SIGNIFICANT CHANGE UP (ref 13–17)
HGB BLD-MCNC: 15.3 G/DL — SIGNIFICANT CHANGE UP (ref 13–17)
INR BLD: 1.8 RATIO — HIGH (ref 0.88–1.16)
MAGNESIUM SERPL-MCNC: 2 MG/DL — SIGNIFICANT CHANGE UP (ref 1.6–2.6)
MCHC RBC-ENTMCNC: 29.3 PG — SIGNIFICANT CHANGE UP (ref 27–34)
MCHC RBC-ENTMCNC: 29.6 PG — SIGNIFICANT CHANGE UP (ref 27–34)
MCHC RBC-ENTMCNC: 33.4 GM/DL — SIGNIFICANT CHANGE UP (ref 32–36)
MCHC RBC-ENTMCNC: 33.6 GM/DL — SIGNIFICANT CHANGE UP (ref 32–36)
MCV RBC AUTO: 87.7 FL — SIGNIFICANT CHANGE UP (ref 80–100)
MCV RBC AUTO: 88 FL — SIGNIFICANT CHANGE UP (ref 80–100)
NRBC # BLD: 0 /100 WBCS — SIGNIFICANT CHANGE UP (ref 0–0)
NRBC # BLD: 0 /100 WBCS — SIGNIFICANT CHANGE UP (ref 0–0)
NT-PROBNP SERPL-SCNC: 3878 PG/ML — HIGH (ref 0–125)
PLATELET # BLD AUTO: 141 K/UL — LOW (ref 150–400)
PLATELET # BLD AUTO: 151 K/UL — SIGNIFICANT CHANGE UP (ref 150–400)
POTASSIUM SERPL-MCNC: 3.9 MMOL/L — SIGNIFICANT CHANGE UP (ref 3.5–5.3)
POTASSIUM SERPL-SCNC: 3.9 MMOL/L — SIGNIFICANT CHANGE UP (ref 3.5–5.3)
PROTHROM AB SERPL-ACNC: 20.3 SEC — HIGH (ref 10–12.9)
RBC # BLD: 5.17 M/UL — SIGNIFICANT CHANGE UP (ref 4.2–5.8)
RBC # BLD: 5.22 M/UL — SIGNIFICANT CHANGE UP (ref 4.2–5.8)
RBC # FLD: 12.7 % — SIGNIFICANT CHANGE UP (ref 10.3–14.5)
RBC # FLD: 12.7 % — SIGNIFICANT CHANGE UP (ref 10.3–14.5)
SODIUM SERPL-SCNC: 144 MMOL/L — SIGNIFICANT CHANGE UP (ref 135–145)
T4 FREE SERPL-MCNC: 1.27 NG/DL — SIGNIFICANT CHANGE UP (ref 0.76–1.46)
TSH SERPL-MCNC: 1.95 UU/ML — SIGNIFICANT CHANGE UP (ref 0.34–4.82)
WBC # BLD: 5.92 K/UL — SIGNIFICANT CHANGE UP (ref 3.8–10.5)
WBC # BLD: 6.84 K/UL — SIGNIFICANT CHANGE UP (ref 3.8–10.5)
WBC # FLD AUTO: 5.92 K/UL — SIGNIFICANT CHANGE UP (ref 3.8–10.5)
WBC # FLD AUTO: 6.84 K/UL — SIGNIFICANT CHANGE UP (ref 3.8–10.5)

## 2019-01-30 RX ORDER — HEPARIN SODIUM 5000 [USP'U]/ML
9000 INJECTION INTRAVENOUS; SUBCUTANEOUS EVERY 6 HOURS
Qty: 0 | Refills: 0 | Status: DISCONTINUED | OUTPATIENT
Start: 2019-01-30 | End: 2019-01-31

## 2019-01-30 RX ORDER — ATORVASTATIN CALCIUM 80 MG/1
40 TABLET, FILM COATED ORAL AT BEDTIME
Qty: 0 | Refills: 0 | Status: DISCONTINUED | OUTPATIENT
Start: 2019-01-30 | End: 2019-02-09

## 2019-01-30 RX ORDER — HEPARIN SODIUM 5000 [USP'U]/ML
4500 INJECTION INTRAVENOUS; SUBCUTANEOUS EVERY 6 HOURS
Qty: 0 | Refills: 0 | Status: DISCONTINUED | OUTPATIENT
Start: 2019-01-30 | End: 2019-01-31

## 2019-01-30 RX ORDER — HEPARIN SODIUM 5000 [USP'U]/ML
INJECTION INTRAVENOUS; SUBCUTANEOUS
Qty: 25000 | Refills: 0 | Status: DISCONTINUED | OUTPATIENT
Start: 2019-01-30 | End: 2019-01-31

## 2019-01-30 RX ORDER — METOPROLOL TARTRATE 50 MG
75 TABLET ORAL EVERY 8 HOURS
Qty: 0 | Refills: 0 | Status: DISCONTINUED | OUTPATIENT
Start: 2019-01-30 | End: 2019-02-01

## 2019-01-30 RX ADMIN — Medication 81 MILLIGRAM(S): at 12:20

## 2019-01-30 RX ADMIN — HEPARIN SODIUM 2000 UNIT(S)/HR: 5000 INJECTION INTRAVENOUS; SUBCUTANEOUS at 18:03

## 2019-01-30 RX ADMIN — Medication 75 MILLIGRAM(S): at 21:15

## 2019-01-30 RX ADMIN — Medication 1 TABLET(S): at 06:11

## 2019-01-30 RX ADMIN — Medication 10 MILLIGRAM(S): at 12:22

## 2019-01-30 RX ADMIN — Medication 5 MILLIGRAM(S): at 18:04

## 2019-01-30 RX ADMIN — LISINOPRIL 2.5 MILLIGRAM(S): 2.5 TABLET ORAL at 06:11

## 2019-01-30 RX ADMIN — ATORVASTATIN CALCIUM 40 MILLIGRAM(S): 80 TABLET, FILM COATED ORAL at 12:19

## 2019-01-30 RX ADMIN — Medication 1 TABLET(S): at 18:04

## 2019-01-30 RX ADMIN — Medication 5 MILLIGRAM(S): at 10:04

## 2019-01-30 RX ADMIN — TAMSULOSIN HYDROCHLORIDE 0.4 MILLIGRAM(S): 0.4 CAPSULE ORAL at 21:15

## 2019-01-30 RX ADMIN — Medication 25 MICROGRAM(S): at 06:11

## 2019-01-30 RX ADMIN — APIXABAN 5 MILLIGRAM(S): 2.5 TABLET, FILM COATED ORAL at 06:11

## 2019-01-30 RX ADMIN — Medication 50 MILLIGRAM(S): at 04:15

## 2019-01-30 RX ADMIN — Medication 50 MILLIGRAM(S): at 14:22

## 2019-01-30 NOTE — PROGRESS NOTE ADULT - SUBJECTIVE AND OBJECTIVE BOX
Patient is a 63y old  Male who presents with a chief complaint of complain of slurry speech and altered mental status.       HPI:  64 yo male presents to the ED  with complain of episode of AMS today. Patient was at a party today when he suddenly began having slurred speech, facial droop, and diaphoresis. he also had +urinary incontinence. Episode lasted approximately 5 minutes, and then symptoms completely resolved. No LOC.  During the hosptial course he was noted to have afib wtih RVR and was started on cardizem drip.  He was also started on heparin drip for anticoagulation    - pt seen and examined by me today. Pt denies any new symptoms today.   - pt seen and examined by me today. c/o anxiety      Family Hx:  Mother: Alive, 91 yr, had colon cancer, DM, Aortic valve replacement  Father:  from Stomach cancer    PMHx:  BPH  Hypothyroidism     PSHx:  No recent surgeries. (2019 00:15)      PAST MEDICAL & SURGICAL HISTORY:  HLD (hyperlipidemia)  Hypercholesterolemia: controlled by diet and exercise  HTN - Hypertension: borderline controlled by diet/  exercise  Left Ear Excision of Cholesteotoma:   Left Inguinal Hernia Repair: @ 8 years old      MEDICATIONS  (STANDING):  diazepam    Tablet 2 milliGRAM(s) Oral once  diltiazem    Tablet 30 milliGRAM(s) Oral every 6 hours  diltiazem Infusion 10 mG/Hr (10 mL/Hr) IV Continuous <Continuous>  doxycycline hyclate Capsule 100 milliGRAM(s) Oral every 12 hours  heparin  Infusion.  Unit(s)/Hr (20 mL/Hr) IV Continuous <Continuous>  influenza   Vaccine 0.5 milliLiter(s) IntraMuscular once  levothyroxine 25 MICROGram(s) Oral daily  metoprolol tartrate 25 milliGRAM(s) Oral two times a day  oxybutynin 10 milliGRAM(s) Oral daily  tamsulosin 0.4 milliGRAM(s) Oral at bedtime    MEDICATIONS  (PRN):  heparin  Injectable 9000 Unit(s) IV Push every 6 hours PRN For aPTT less than 40  heparin  Injectable 4500 Unit(s) IV Push every 6 hours PRN For aPTT between 40 - 57      FAMILY HISTORY:      SOCIAL HISTORY:    REVIEW OF SYSTEMS:  CONSTITUTIONAL:    No fatigue, malaise, lethargy.  No fever or chills.  HEENT:  Eyes:  No visual changes.     ENT:  No epistaxis.  No sinus pain.    RESPIRATORY:  No cough.  No wheeze.  No hemoptysis.  No shortness of breath.  CARDIOVASCULAR:  No chest pains.  No palpitations. No shortness of breath, No orthopnea or PND.  GASTROINTESTINAL:  No abdominal pain.  No nausea or vomiting.    GENITOURINARY:    No hematuria.    MUSCULOSKELETAL:  No musculoskeletal pain.  No joint swelling.  No arthritis.  NEUROLOGICAL:  No tingling or numbness or weakness.  PSYCHIATRIC:  No confusion  SKIN:  No rashes.    ENDOCRINE:  No unexplained weight loss.  No polydipsia.   HEMATOLOGIC:  No anemia.  No prolonged or excessive bleeding.   ALLERGIC AND IMMUNOLOGIC:  No pruritus.          Vital Signs Last 24 Hrs  T(C): 36.7 (2019 04:36), Max: 36.8 (2019 17:14)  T(F): 98 (2019 04:36), Max: 98.2 (2019 17:14)  HR: 72 (2019 04:36) (72 - 117)  BP: 108/70 (2019 04:36) (106/59 - 121/73)  BP(mean): --  RR: 18 (2019 17:14) (18 - 18)  SpO2: 98% (2019 04:36) (96% - 99%)    PHYSICAL EXAM-    Constitutional: no acute distres    Head: Head is normocephalic and atraumatic.      Neck:  There are strong carotid pulses bilaterally. No JVD.     Cardiovascular: irregular rate and tachycardic.      Respiratory: Breathsounds are normal. No rales. No wheezing.    Abdomen: Soft, nontender, nondistended with positive bowel sounds.      Extremity: No tenderness. No  pitting edema     Neurologic: The patient is alert and oriented.      Skin: No rash, no obvious lesions noted.      Psychiatric: The patient appears to be emotionally stable.      INTERPRETATION OF TELEMETRY: afib 100/min    ECG:     I&O's Detail    2019 07:01  -  2019 07:00  --------------------------------------------------------  IN:    diltiazem Infusion: 248 mL    heparin  Infusion.: 387.9 mL  Total IN: 635.9 mL    OUT:  Total OUT: 0 mL    Total NET: 635.9 mL          LABS:                        15.5   6.55  )-----------( 155      ( 2019 06:55 )             46.6         145  |  117<H>  |  13  ----------------------------<  97  4.1   |  21<L>  |  1.25    Ca    8.8      2019 09:22  Mg     1.9         TPro  6.4  /  Alb  3.4  /  TBili  1.8<H>  /  DBili  x   /  AST  43<H>  /  ALT  126<H>  /  AlkPhos  68      CARDIAC MARKERS ( 2019 09:22 )  0.040 ng/mL / x     / x     / x     / x      CARDIAC MARKERS ( 2019 17:53 )  0.029 ng/mL / x     / x     / x     / x          PT/INR - ( 2019 17:53 )   PT: 16.5 sec;   INR: 1.47 ratio         PTT - ( 2019 06:56 )  PTT:84.3 sec  Urinalysis Basic - ( 2019 19:30 )    Color: Yellow / Appearance: Clear / S.025 / pH: x  Gluc: x / Ketone: Negative  / Bili: Negative / Urobili: Negative mg/dL   Blood: x / Protein: 15 mg/dL / Nitrite: Negative   Leuk Esterase: Trace / RBC: Negative /HPF / WBC 3-5   Sq Epi: x / Non Sq Epi: Occasional / Bacteria: Negative      I&O's Summary    2019 07:01  -  2019 07:00  --------------------------------------------------------  IN: 635.9 mL / OUT: 0 mL / NET: 635.9 mL      BNP  RADIOLOGY & ADDITIONAL STUDIES:  < from: CT Head No Cont (19 @ 18:17) >  IMPRESSION:     No acute intracranial bleeding, mass effect, or shift. Mild chronic   microvascular ischemic changes.                 NOEMI PEREIRA   This document has been electronically signed. 2019  6:26PM    < end of copied text >  < from: Transthoracic Echocardiogram (19 @ 12:02) >   Summary     The left ventricle cavity is mildly dilated. Left ventricle systolic   function appears severely impaired globally along with segmental wall   motion abnormalities noted. Severe hypokinesis of the inferior,   inferoseptum, anteroseptum and anterior walls. The apical cap is   hypokinetic and free of thrombus. Estimated ejection fraction is 18% via   bi-plane method.   The left atrium is moderately dilated.   The right atrium appears borderline dilated.   The right ventricle is borderline dilated with mildly impaired function.   The aortic valve is well visualized, appears mildly sclerotic. Aortic   valve leaflet excursion is preserved.   Trace aortic regurgitation is present.   The mitral valve leaflets are well seen thin and pliable; preserved   leaflet excursion noted. Trace to mild mitral valve regurgitation noted.   Redundant chordae noted.   EA reversal of the mitral inflow consistent with reduced compliance of   the   left ventricle (When appreciable.)   The tricuspid valve leaflets are well seen and appear thin and pliable   with preserved leaflets excursion. Trace tricuspid regurgitation noted.   The pulmonic valve leaflets appear thin and pliable. Trace to mild   pulmonic regurgitation noted.   No evidence of pericardial effusion.   No evidence of pleural effusion.     Signature     ----------------------------------------------------------------   Electronically signed by Mark Dueñas MD(Interpreting   physician) on 2019 05:48 PM   ----------------------------------------------------------------    < end of copied text >  < from: MR Head No Cont (19 @ 10:16) >  IMPRESSION:    small subcentimeter foci of restricted diffusion in the   RIGHT insular cortex and RIGHT parietal cortex stent with acute   infarctions. Bilateral mastoiditis is noted.                ELSY HATCH M.D., ATTENDING RADIOLOGIST  This document has been electronically signed. 2019 10:42AM    < end of copied text >

## 2019-01-30 NOTE — PROGRESS NOTE ADULT - ASSESSMENT
Slurred speech- Appreciate neurology team input.  MRI results noted.  He was noted to have small foci consistent with acute infarcts per MRI report.  Likely embolic in nature  Anticoagulation management as stated below.  Pt is on full dose anticoauglation now. Will hold off on MIHIR for now.     Afib with RVR -poor rate control.  can increase metoprolol as his HR had been fluctuating and trending up at times.  Hold Elliquis prior to [facility].  [facility] today cancelled as he got Elliquis this am.     Newly diagnosed HFrEF- LVEF 18%. Dilated cardiomyopathy with wall motion abnormalities as stated above.    Hold Elliquis according to the [facility] timing - likely tomorrow.   Discussed the findings and plan with pt at length.    GDMT for HFrEF.  WIll hold lisinopril to accomodate rate control agents.|  Will increase metoprolol and later when rate better controlled transition him to toprol XL  Appears euvolemic on exam.       HTN- meds as stated above.     Hyperlipdiemia- statin     Other medical issues- Management per primary team.  Thank you for allowing me to participate in the care of this patient. Please feel free to contact me with any questions.

## 2019-01-30 NOTE — PROGRESS NOTE ADULT - SUBJECTIVE AND OBJECTIVE BOX
HOSPITALIST ATTENDING PROGRESS NOTE    Chart and meds reviewed.  Patient seen and examined.    HPI: 62 yo male presents to the ED  with complain of episode of AMS today. Patient was at a party today when he suddenly began having slurred speech, facial droop, and diaphoresis. he also had +urinary incontinence. Episode lasted approximately 5 minutes, and then symptoms completely resolved. No LOC. Then he started to have SOB. He still felt SOB during his presentation in ED. As per patient, he has been "not feeling well" for a month, with palpitations and SOB described as "throat tightness." Patient was seen by PMD, was prescribed Augmentin for possible sinus infection, but with no relief of SOB. Patient has been taking Tylenol PM for symptoms and for sleep aid. Patient also has a cyst on his abd, for which he is on abx, doxycycline. Denies tobacco use, illicit drug use. Pt drank 1.5 glasses of wine at party today. Patient has been on a diet to lose weight, has decreased his portion sizes and decreased sugary and fatty foods in his diet, does not eat red meat. Exercises 3-4 days per week.    1/30/19 pt seen and examined, cath postponed due to being on eliquis. received AM dose, DC eliquis, start heparin gtt in evening, stop prior to cath.    All 10 systems reviewed and found to be negative with the exception of what has been described above.    MEDICATIONS  (STANDING):  aspirin  chewable 81 milliGRAM(s) Oral daily  atorvastatin 40 milliGRAM(s) Oral at bedtime  influenza   Vaccine 0.5 milliLiter(s) IntraMuscular once  levothyroxine 25 MICROGram(s) Oral daily  metoprolol tartrate 50 milliGRAM(s) Oral every 8 hours  oxybutynin 10 milliGRAM(s) Oral daily  tamsulosin 0.4 milliGRAM(s) Oral at bedtime  trimethoprim  160 mG/sulfamethoxazole 800 mG 1 Tablet(s) Oral two times a day    MEDICATIONS  (PRN):  metoprolol tartrate Injectable 5 milliGRAM(s) IV Push every 6 hours PRN HR >130      VITALS:  T(F): 97.6 (01-30-19 @ 11:14), Max: 98.1 (01-29-19 @ 21:52)  HR: 120 (01-30-19 @ 14:20) (68 - 132)  BP: 99/60 (01-30-19 @ 14:20) (88/70 - 129/90)  RR: 19 (01-30-19 @ 11:14) (16 - 19)  SpO2: 93% (01-30-19 @ 11:14) (93% - 102%)  Wt(kg): --    I&O's Summary      CAPILLARY BLOOD GLUCOSE          PHYSICAL EXAM:    HEENT:  pupils equal and reactive, EOMI, no oropharyngeal lesions, erythema, exudates, oral thrush    NECK:   supple, no carotid bruits, no palpable lymph nodes, no thyromegaly    CV:  +S1, +S2, regular, no murmurs or rubs    RESP:   lungs clear to auscultation bilaterally, no wheezing, rales, rhonchi, good air entry bilaterally    BREAST:  not examined    GI:  abdomen soft, non-tender, non-distended, normal BS, no bruits, no abdominal masses, no palpable masses    RECTAL:  not examined    :  not examined    MSK:   normal muscle tone, no atrophy, no rigidity, no contractions    EXT:   no clubbing, no cyanosis, no edema, no calf pain, swelling or erythema    VASCULAR:  pulses equal and symmetric in the upper and lower extremities    NEURO:  AAOX3, no focal neurological deficits, follows all commands, able to move extremities spontaneously    SKIN:  no ulcers, lesions or rashes    LABS:                            15.3   5.92  )-----------( 141      ( 30 Jan 2019 06:04 )             45.8     01-30    144  |  113<H>  |  18  ----------------------------<  70  3.9   |  24  |  1.25    Ca    8.6      30 Jan 2019 06:04  Mg     2.0     01-30            PT/INR - ( 30 Jan 2019 10:18 )   PT: 20.3 sec;   INR: 1.80 ratio         PTT - ( 30 Jan 2019 10:18 )  PTT:35.9 sec                                  CULTURES:

## 2019-01-30 NOTE — PROGRESS NOTE ADULT - ASSESSMENT
Assessment and Plan:    1. Acute Rt cortex CVA, probably due to Afib  - start statin  - start heparin drip this evening for CVA prophylaxis  US Carotids noted: negative, MRI brain noted    2. Afib: not controlled  will increase Beta blockers  anticoagulation with Eliquis (on hold for cath)    3. Abd cyst:  cw Bactrim    4. New onset systolic CHF.  c/w ACEinh  c/w BBlockers  started statin  cath tomm    DC planning

## 2019-01-31 LAB
ALBUMIN SERPL ELPH-MCNC: 3.4 G/DL — SIGNIFICANT CHANGE UP (ref 3.3–5)
ALP SERPL-CCNC: 72 U/L — SIGNIFICANT CHANGE UP (ref 40–120)
ALT FLD-CCNC: 99 U/L — HIGH (ref 12–78)
ANION GAP SERPL CALC-SCNC: 5 MMOL/L — SIGNIFICANT CHANGE UP (ref 5–17)
APTT BLD: 106.3 SEC — HIGH (ref 27.5–36.3)
APTT BLD: 133.7 SEC — CRITICAL HIGH (ref 27.5–36.3)
AST SERPL-CCNC: 41 U/L — HIGH (ref 15–37)
BILIRUB SERPL-MCNC: 1.4 MG/DL — HIGH (ref 0.2–1.2)
BUN SERPL-MCNC: 18 MG/DL — SIGNIFICANT CHANGE UP (ref 7–23)
CALCIUM SERPL-MCNC: 8.9 MG/DL — SIGNIFICANT CHANGE UP (ref 8.5–10.1)
CHLORIDE SERPL-SCNC: 112 MMOL/L — HIGH (ref 96–108)
CO2 SERPL-SCNC: 26 MMOL/L — SIGNIFICANT CHANGE UP (ref 22–31)
CREAT SERPL-MCNC: 1.44 MG/DL — HIGH (ref 0.5–1.3)
GLUCOSE SERPL-MCNC: 98 MG/DL — SIGNIFICANT CHANGE UP (ref 70–99)
HCT VFR BLD CALC: 50.4 % — HIGH (ref 39–50)
HGB BLD-MCNC: 16.5 G/DL — SIGNIFICANT CHANGE UP (ref 13–17)
MCHC RBC-ENTMCNC: 28.8 PG — SIGNIFICANT CHANGE UP (ref 27–34)
MCHC RBC-ENTMCNC: 32.7 GM/DL — SIGNIFICANT CHANGE UP (ref 32–36)
MCV RBC AUTO: 88 FL — SIGNIFICANT CHANGE UP (ref 80–100)
NRBC # BLD: 0 /100 WBCS — SIGNIFICANT CHANGE UP (ref 0–0)
PLATELET # BLD AUTO: 157 K/UL — SIGNIFICANT CHANGE UP (ref 150–400)
POTASSIUM SERPL-MCNC: 4.7 MMOL/L — SIGNIFICANT CHANGE UP (ref 3.5–5.3)
POTASSIUM SERPL-SCNC: 4.7 MMOL/L — SIGNIFICANT CHANGE UP (ref 3.5–5.3)
PROT SERPL-MCNC: 6.6 GM/DL — SIGNIFICANT CHANGE UP (ref 6–8.3)
RBC # BLD: 5.73 M/UL — SIGNIFICANT CHANGE UP (ref 4.2–5.8)
RBC # FLD: 12.7 % — SIGNIFICANT CHANGE UP (ref 10.3–14.5)
SODIUM SERPL-SCNC: 143 MMOL/L — SIGNIFICANT CHANGE UP (ref 135–145)
WBC # BLD: 5.74 K/UL — SIGNIFICANT CHANGE UP (ref 3.8–10.5)
WBC # FLD AUTO: 5.74 K/UL — SIGNIFICANT CHANGE UP (ref 3.8–10.5)

## 2019-01-31 RX ORDER — DIGOXIN 250 MCG
0.25 TABLET ORAL ONCE
Qty: 0 | Refills: 0 | Status: DISCONTINUED | OUTPATIENT
Start: 2019-01-31 | End: 2019-01-31

## 2019-01-31 RX ORDER — METOPROLOL TARTRATE 50 MG
25 TABLET ORAL ONCE
Qty: 0 | Refills: 0 | Status: COMPLETED | OUTPATIENT
Start: 2019-01-31 | End: 2019-01-31

## 2019-01-31 RX ORDER — APIXABAN 2.5 MG/1
5 TABLET, FILM COATED ORAL EVERY 12 HOURS
Qty: 0 | Refills: 0 | Status: DISCONTINUED | OUTPATIENT
Start: 2019-01-31 | End: 2019-02-09

## 2019-01-31 RX ORDER — DIGOXIN 250 MCG
0.25 TABLET ORAL ONCE
Qty: 0 | Refills: 0 | Status: COMPLETED | OUTPATIENT
Start: 2019-01-31 | End: 2019-01-31

## 2019-01-31 RX ADMIN — Medication 5 MILLIGRAM(S): at 12:41

## 2019-01-31 RX ADMIN — ATORVASTATIN CALCIUM 40 MILLIGRAM(S): 80 TABLET, FILM COATED ORAL at 21:02

## 2019-01-31 RX ADMIN — TAMSULOSIN HYDROCHLORIDE 0.4 MILLIGRAM(S): 0.4 CAPSULE ORAL at 21:02

## 2019-01-31 RX ADMIN — Medication 81 MILLIGRAM(S): at 09:40

## 2019-01-31 RX ADMIN — Medication 0.25 MILLIGRAM(S): at 09:29

## 2019-01-31 RX ADMIN — Medication 1 TABLET(S): at 05:01

## 2019-01-31 RX ADMIN — APIXABAN 5 MILLIGRAM(S): 2.5 TABLET, FILM COATED ORAL at 21:02

## 2019-01-31 RX ADMIN — Medication 10 MILLIGRAM(S): at 09:40

## 2019-01-31 RX ADMIN — Medication 1 TABLET(S): at 18:41

## 2019-01-31 RX ADMIN — Medication 75 MILLIGRAM(S): at 21:03

## 2019-01-31 RX ADMIN — Medication 75 MILLIGRAM(S): at 05:01

## 2019-01-31 RX ADMIN — HEPARIN SODIUM 1600 UNIT(S)/HR: 5000 INJECTION INTRAVENOUS; SUBCUTANEOUS at 01:33

## 2019-01-31 RX ADMIN — HEPARIN SODIUM 1400 UNIT(S)/HR: 5000 INJECTION INTRAVENOUS; SUBCUTANEOUS at 10:39

## 2019-01-31 RX ADMIN — Medication 75 MILLIGRAM(S): at 14:17

## 2019-01-31 RX ADMIN — HEPARIN SODIUM 0 UNIT(S)/HR: 5000 INJECTION INTRAVENOUS; SUBCUTANEOUS at 00:28

## 2019-01-31 RX ADMIN — Medication 25 MICROGRAM(S): at 05:01

## 2019-01-31 RX ADMIN — Medication 0.25 MILLIGRAM(S): at 18:48

## 2019-01-31 RX ADMIN — Medication 25 MILLIGRAM(S): at 15:02

## 2019-01-31 NOTE — PROGRESS NOTE ADULT - SUBJECTIVE AND OBJECTIVE BOX
HOSPITALIST ATTENDING PROGRESS NOTE    Chart and meds reviewed.  Patient seen and examined.    HPI: 64 yo male presents to the ED  with complain of episode of AMS today. Patient was at a party today when he suddenly began having slurred speech, facial droop, and diaphoresis. he also had +urinary incontinence. Episode lasted approximately 5 minutes, and then symptoms completely resolved. No LOC. Then he started to have SOB. He still felt SOB during his presentation in ED. As per patient, he has been "not feeling well" for a month, with palpitations and SOB described as "throat tightness." Patient was seen by PMD, was prescribed Augmentin for possible sinus infection, but with no relief of SOB. Patient has been taking Tylenol PM for symptoms and for sleep aid. Patient also has a cyst on his abd, for which he is on abx, doxycycline. Denies tobacco use, illicit drug use. Pt drank 1.5 glasses of wine at party today. Patient has been on a diet to lose weight, has decreased his portion sizes and decreased sugary and fatty foods in his diet, does not eat red meat. Exercises 3-4 days per week.    1/30/19 pt seen and examined, cath postponed due to being on eliquis. received AM dose, DC eliquis, start heparin gtt in evening, stop prior to cath.    1/31/19 awaiting cath today, HR uncontrolled with walking especially.     All 10 systems reviewed and found to be negative with the exception of what has been described above.    MEDICATIONS  (STANDING):  aspirin  chewable 81 milliGRAM(s) Oral daily  atorvastatin 40 milliGRAM(s) Oral at bedtime  heparin  Infusion.  Unit(s)/Hr (20 mL/Hr) IV Continuous <Continuous>  influenza   Vaccine 0.5 milliLiter(s) IntraMuscular once  levothyroxine 25 MICROGram(s) Oral daily  metoprolol tartrate 75 milliGRAM(s) Oral every 8 hours  metoprolol tartrate 25 milliGRAM(s) Oral once  oxybutynin 10 milliGRAM(s) Oral daily  tamsulosin 0.4 milliGRAM(s) Oral at bedtime  trimethoprim  160 mG/sulfamethoxazole 800 mG 1 Tablet(s) Oral two times a day    MEDICATIONS  (PRN):  heparin  Injectable 9000 Unit(s) IV Push every 6 hours PRN For aPTT less than 40  heparin  Injectable 4500 Unit(s) IV Push every 6 hours PRN For aPTT between 40 - 57  metoprolol tartrate Injectable 5 milliGRAM(s) IV Push every 6 hours PRN HR >130      VITALS:  T(F): 98.8 (01-31-19 @ 11:12), Max: 98.8 (01-31-19 @ 11:12)  HR: 150 (01-31-19 @ 12:40) (76 - 150)  BP: 102/67 (01-31-19 @ 12:40) (99/72 - 134/69)  RR: 18 (01-31-19 @ 11:12) (18 - 18)  SpO2: 96% (01-31-19 @ 11:12) (93% - 98%)  Wt(kg): --    I&O's Summary    30 Jan 2019 07:01  -  31 Jan 2019 07:00  --------------------------------------------------------  IN: 181.2 mL / OUT: 0 mL / NET: 181.2 mL        CAPILLARY BLOOD GLUCOSE          PHYSICAL EXAM:    HEENT:  pupils equal and reactive, EOMI, no oropharyngeal lesions, erythema, exudates, oral thrush    NECK:   supple, no carotid bruits, no palpable lymph nodes, no thyromegaly    CV:  +S1, +S2, regular, no murmurs or rubs    RESP:   lungs clear to auscultation bilaterally, no wheezing, rales, rhonchi, good air entry bilaterally    BREAST:  not examined    GI:  abdomen soft, non-tender, non-distended, normal BS, no bruits, no abdominal masses, no palpable masses    RECTAL:  not examined    :  not examined    MSK:   normal muscle tone, no atrophy, no rigidity, no contractions    EXT:   no clubbing, no cyanosis, no edema, no calf pain, swelling or erythema    VASCULAR:  pulses equal and symmetric in the upper and lower extremities    NEURO:  AAOX3, no focal neurological deficits, follows all commands, able to move extremities spontaneously    SKIN:  no ulcers, lesions or rashes    LABS:                            16.5   5.74  )-----------( 157      ( 31 Jan 2019 07:20 )             50.4     01-31    143  |  112<H>  |  18  ----------------------------<  98  4.7   |  26  |  1.44<H>    Ca    8.9      31 Jan 2019 07:20  Mg     2.0     01-30    TPro  6.6  /  Alb  3.4  /  TBili  1.4<H>  /  DBili  x   /  AST  41<H>  /  ALT  99<H>  /  AlkPhos  72  01-31        LIVER FUNCTIONS - ( 31 Jan 2019 07:20 )  Alb: 3.4 g/dL / Pro: 6.6 gm/dL / ALK PHOS: 72 U/L / ALT: 99 U/L / AST: 41 U/L / GGT: x           PT/INR - ( 30 Jan 2019 10:18 )   PT: 20.3 sec;   INR: 1.80 ratio         PTT - ( 31 Jan 2019 07:20 )  PTT:106.3 sec                                  CULTURES:

## 2019-01-31 NOTE — CHART NOTE - NSCHARTNOTEFT_GEN_A_CORE
Nurse Practitioner Progress note:     HPI:  62 yo male presents to the ED  with complain of episode of AMS today. Patient was at a party today when he suddenly began having slurred speech, facial droop, and diaphoresis. he also had +urinary incontinence. Episode lasted approximately 5 minutes, and then symptoms completely resolved. No LOC. Then he started to have SOB. He still felt SOB during his presentation in ED. As per patient, he has been "not feeling well" for a month, with palpitations and SOB described as "throat tightness." Patient was seen by PMD, was prescribed Augmentin for possible sinus infection, but with no relief of SOB. Patient has been taking Tylenol PM for symptoms and for sleep aid. Patient also has a cyst on his abd, for which he is on abx, doxycycline. Denies tobacco use, illicit drug use. Pt drank 1.5 glasses of wine at party today. Patient has been on a diet to lose weight, has decreased his portion sizes and decreased sugary and fatty foods in his diet, does not eat red meat. Exercises 3-4 days per week.  During my evaluation at the bed side patient was totally any symptom free. No slurry speech no sob, no palpitation.     Family Hx:  Mother: Alive, 91 yr, had colon cancer, DM, Aortic valve replacement  Father:  from Stomach cancer    PMHx:  BPH  Hypothyroidism     PSHx:  No recent surgeries. (2019 00:15)    T(C): 36.2 (19 @ 15:56), Max: 37.1 (19 @ 11:12)  HR: 108 (19 @ 15:56) (106 - 150)  BP: 113/83 (19 @ 15:56) (99/72 - 113/83)  RR: 19 (19 @ 15:56) (18 - 19)  SpO2: 100% (19 @ 15:56) (96% - 100%)  Wt(kg): --    PHYSICAL EXAM:  Neurologic: Non-focal, AxOx3.  No neuro deficits  Vascular: Peripheral pulses palpable 2+ bilaterally  Procedure Site: Rt. femoral arterial and venous sheath pulled manual pressure applied x20 minutes site benign soft no bleeding no hematoma +1PP    12 lead EKG:  	    LABS:	 	                        16.5   5.74  )-----------( 157      ( 2019 07:20 )             50.4       143  |  112<H>  |  18  ----------------------------<  98  4.7   |  26  |  1.44<H>    Ca    8.9      2019 07:20  Mg     2.0         TPro  6.6  /  Alb  3.4  /  TBili  1.4<H>  /  DBili  x   /  AST  41<H>  /  ALT  99<H>  /  AlkPhos  72        PROCEDURE RESULTS:  S/P LHC non-obstructive CAD  S/P C      ASSESSMENT/PLAN:   62 yo male presents to the ED  with complain of episode of AMS today. Patient was at a party today when he suddenly began having slurred speech, facial droop, and diaphoresis. he also had +urinary incontinence. Episode lasted approximately 5 minutes, and then symptoms completely resolved. No LOC. Then he started to have SOB. He still felt SOB during his presentation in ED. As per patient, he has been "not feeling well" for a month, with palpitations and SOB described as "throat tightness." Patient was seen by PMD, was prescribed Augmentin for possible sinus infection, but with no relief of SOB. Patient has been taking Tylenol PM for symptoms and for sleep aid. Patient also has a cyst on his abd, for which he is on abx, doxycycline. Denies tobacco use, illicit drug use. Pt drank 1.5 glasses of wine at party today. Patient has been on a diet to lose weight, has decreased his portion sizes and decreased sugary and fatty foods in his diet, does not eat red meat. Exercises 3-4 days per week.  During my evaluation at the bed side patient was totally any symptom free. No slurry speech no sob, no palpitation.     	  -VS, labs, diet, activity as per post cath orders  -IV hydration  -Encourage PO fluids  -Continue current medications  -Plan of care D/W pt. and MD  -Post cath instructions reviewed with pt., pt. verbalizes and understands instructions  -Follow-up with attending

## 2019-01-31 NOTE — PROGRESS NOTE ADULT - ASSESSMENT
Assessment and Plan:    1. 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 Afib: not controlled  - Beta blockers increased, may need digoxin  - anticoagulation with Eliquis (on hold for cath)    3. Abd cyst:  cw Bactrim    4. New onset systolic CHF EF 18%  - initiate ACE I after cath   - continue statin, beta blockers  - may need cardiology follow up as outpatient to see EF improves, if not will need ICD     outpt cardio, neuro, PMD follow up

## 2019-01-31 NOTE — PROGRESS NOTE ADULT - ASSESSMENT
Slurred speech- Appreciate neurology team input.  MRI results noted.  He was noted to have small foci consistent with acute infarcts per MRI report.  Likely embolic in nature  Anticoagulation management as stated below.  Pt is on full dose anticoauglation now. Will hold off on MIHIR for now.     Afib with RVR -poor rate control.  continue metoprolol for now.  Can increase it to 100mg from 75mg.  will give one dose iv digoxin.  Hold Elliquis prior to [facility].      Newly diagnosed HFrEF- LVEF 18%. Dilated cardiomyopathy with wall motion abnormalities as stated above.    Hold Elliquis according to the [facility] today.   Discussed the findings and plan with pt at length.    GDMT for HFrEF.  WIll hold lisinopril to accomodate rate control agents.|  Will increase metoprolol and later when rate better controlled transition him to toprol XL  Appears euvolemic on exam.       HTN- meds as stated above.     Hyperlipdiemia- statin     Other medical issues- Management per primary team.  Thank you for allowing me to participate in the care of this patient. Please feel free to contact me with any questions.

## 2019-02-01 ENCOUNTER — TRANSCRIPTION ENCOUNTER (OUTPATIENT)
Age: 64
End: 2019-02-01

## 2019-02-01 LAB
ALBUMIN SERPL ELPH-MCNC: 3.2 G/DL — LOW (ref 3.3–5)
ALP SERPL-CCNC: 74 U/L — SIGNIFICANT CHANGE UP (ref 40–120)
ALT FLD-CCNC: 79 U/L — HIGH (ref 12–78)
ANION GAP SERPL CALC-SCNC: 3 MMOL/L — LOW (ref 5–17)
AST SERPL-CCNC: 32 U/L — SIGNIFICANT CHANGE UP (ref 15–37)
BILIRUB SERPL-MCNC: 1.2 MG/DL — SIGNIFICANT CHANGE UP (ref 0.2–1.2)
BUN SERPL-MCNC: 18 MG/DL — SIGNIFICANT CHANGE UP (ref 7–23)
CALCIUM SERPL-MCNC: 8.5 MG/DL — SIGNIFICANT CHANGE UP (ref 8.5–10.1)
CHLORIDE SERPL-SCNC: 111 MMOL/L — HIGH (ref 96–108)
CO2 SERPL-SCNC: 29 MMOL/L — SIGNIFICANT CHANGE UP (ref 22–31)
CREAT SERPL-MCNC: 1.41 MG/DL — HIGH (ref 0.5–1.3)
GLUCOSE SERPL-MCNC: 71 MG/DL — SIGNIFICANT CHANGE UP (ref 70–99)
HCT VFR BLD CALC: 50.9 % — HIGH (ref 39–50)
HGB BLD-MCNC: 16.9 G/DL — SIGNIFICANT CHANGE UP (ref 13–17)
MCHC RBC-ENTMCNC: 29 PG — SIGNIFICANT CHANGE UP (ref 27–34)
MCHC RBC-ENTMCNC: 33.2 GM/DL — SIGNIFICANT CHANGE UP (ref 32–36)
MCV RBC AUTO: 87.3 FL — SIGNIFICANT CHANGE UP (ref 80–100)
NRBC # BLD: 0 /100 WBCS — SIGNIFICANT CHANGE UP (ref 0–0)
PLATELET # BLD AUTO: 146 K/UL — LOW (ref 150–400)
POTASSIUM SERPL-MCNC: 4.5 MMOL/L — SIGNIFICANT CHANGE UP (ref 3.5–5.3)
POTASSIUM SERPL-SCNC: 4.5 MMOL/L — SIGNIFICANT CHANGE UP (ref 3.5–5.3)
PROT SERPL-MCNC: 6.1 GM/DL — SIGNIFICANT CHANGE UP (ref 6–8.3)
RBC # BLD: 5.83 M/UL — HIGH (ref 4.2–5.8)
RBC # FLD: 12.8 % — SIGNIFICANT CHANGE UP (ref 10.3–14.5)
SODIUM SERPL-SCNC: 143 MMOL/L — SIGNIFICANT CHANGE UP (ref 135–145)
WBC # BLD: 4.9 K/UL — SIGNIFICANT CHANGE UP (ref 3.8–10.5)
WBC # FLD AUTO: 4.9 K/UL — SIGNIFICANT CHANGE UP (ref 3.8–10.5)

## 2019-02-01 RX ORDER — DIGOXIN 250 MCG
0.25 TABLET ORAL ONCE
Qty: 0 | Refills: 0 | Status: COMPLETED | OUTPATIENT
Start: 2019-02-01 | End: 2019-02-01

## 2019-02-01 RX ORDER — METOPROLOL TARTRATE 50 MG
1 TABLET ORAL
Qty: 90 | Refills: 0 | OUTPATIENT
Start: 2019-02-01 | End: 2019-03-02

## 2019-02-01 RX ORDER — ATORVASTATIN CALCIUM 80 MG/1
1 TABLET, FILM COATED ORAL
Qty: 30 | Refills: 0
Start: 2019-02-01 | End: 2019-03-02

## 2019-02-01 RX ORDER — APIXABAN 2.5 MG/1
1 TABLET, FILM COATED ORAL
Qty: 60 | Refills: 0
Start: 2019-02-01 | End: 2019-03-02

## 2019-02-01 RX ORDER — METOPROLOL TARTRATE 50 MG
100 TABLET ORAL EVERY 8 HOURS
Qty: 0 | Refills: 0 | Status: DISCONTINUED | OUTPATIENT
Start: 2019-02-01 | End: 2019-02-02

## 2019-02-01 RX ORDER — ASPIRIN/CALCIUM CARB/MAGNESIUM 324 MG
1 TABLET ORAL
Qty: 30 | Refills: 0
Start: 2019-02-01 | End: 2019-03-02

## 2019-02-01 RX ADMIN — Medication 10 MILLIGRAM(S): at 11:27

## 2019-02-01 RX ADMIN — Medication 25 MICROGRAM(S): at 05:20

## 2019-02-01 RX ADMIN — Medication 81 MILLIGRAM(S): at 11:26

## 2019-02-01 RX ADMIN — APIXABAN 5 MILLIGRAM(S): 2.5 TABLET, FILM COATED ORAL at 05:20

## 2019-02-01 RX ADMIN — ATORVASTATIN CALCIUM 40 MILLIGRAM(S): 80 TABLET, FILM COATED ORAL at 21:22

## 2019-02-01 RX ADMIN — Medication 1 TABLET(S): at 05:20

## 2019-02-01 RX ADMIN — TAMSULOSIN HYDROCHLORIDE 0.4 MILLIGRAM(S): 0.4 CAPSULE ORAL at 21:22

## 2019-02-01 RX ADMIN — Medication 0.25 MILLIGRAM(S): at 12:30

## 2019-02-01 RX ADMIN — Medication 100 MILLIGRAM(S): at 15:38

## 2019-02-01 RX ADMIN — Medication 75 MILLIGRAM(S): at 05:22

## 2019-02-01 RX ADMIN — APIXABAN 5 MILLIGRAM(S): 2.5 TABLET, FILM COATED ORAL at 17:54

## 2019-02-01 RX ADMIN — Medication 100 MILLIGRAM(S): at 21:22

## 2019-02-01 RX ADMIN — Medication 1 TABLET(S): at 17:54

## 2019-02-01 NOTE — DISCHARGE NOTE ADULT - MEDICATION SUMMARY - MEDICATIONS TO TAKE
I will START or STAY ON the medications listed below when I get home from the hospital:    Aspirin Low Dose 81 mg oral tablet, chewable  -- 1 tab(s) by mouth once a day  -- Indication: For NEW MED    tamsulosin 0.4 mg oral capsule  -- 1 cap(s) by mouth once a day  -- Indication: For Home med    Eliquis 5 mg oral tablet  -- 1 tab(s) by mouth every 12 hours  -- Indication: For NEW MED    Lipitor 40 mg oral tablet  -- 1 tab(s) by mouth once a day (at bedtime)  -- Indication: For NEW MED    metoprolol tartrate 100 mg oral tablet  -- 1 tab(s) by mouth every 8 hours  -- Indication: For NEW MED    sulfamethoxazole-trimethoprim 800 mg-160 mg oral tablet  -- 1 tab(s) by mouth 2 times a day  -- Indication: For short term    levothyroxine 25 mcg (0.025 mg) oral tablet  -- 1 tab(s) by mouth once a day  -- Indication: For Home med    oxybutynin 10 mg/24 hr oral tablet, extended release  -- 1 tab(s) by mouth once a day  -- Indication: For Home med I will START or STAY ON the medications listed below when I get home from the hospital:    Aspirin Low Dose 81 mg oral tablet, chewable  -- 1 tab(s) by mouth once a day  -- Indication: For stroke    tamsulosin 0.4 mg oral capsule  -- 1 cap(s) by mouth once a day  -- Indication: For bph    digoxin 250 mcg (0.25 mg) oral tablet  -- 1 tab(s) by mouth once a day  -- Indication: For Afib    Eliquis 5 mg oral tablet  -- 1 tab(s) by mouth every 12 hours  -- Indication: For Afib    Lipitor 40 mg oral tablet  -- 1 tab(s) by mouth once a day (at bedtime)  -- Indication: For stroke    metoprolol tartrate 100 mg oral tablet  -- 1 tab(s) by mouth every 8 hours  -- Indication: For Afib    levothyroxine 25 mcg (0.025 mg) oral tablet  -- 1 tab(s) by mouth once a day  -- Indication: For Thyroid    oxybutynin 10 mg/24 hr oral tablet, extended release  -- 1 tab(s) by mouth once a day  -- Indication: For bladder incontinence I will START or STAY ON the medications listed below when I get home from the hospital:    Aspirin Low Dose 81 mg oral tablet, chewable  -- 1 tab(s) by mouth once a day  -- Indication: For stroke    tamsulosin 0.4 mg oral capsule  -- 1 cap(s) by mouth once a day  -- Indication: For bph    digoxin 250 mcg (0.25 mg) oral tablet  -- 1 tab(s) by mouth once a day  -- Indication: For Afib    Eliquis 5 mg oral tablet  -- 1 tab(s) by mouth every 12 hours  -- Indication: For Afib    Lipitor 40 mg oral tablet  -- 1 tab(s) by mouth once a day (at bedtime)  -- Indication: For stroke    Toprol- mg oral tablet, extended release  -- 1 tab(s) by mouth 2 times a day ( I know it is 200mg po BID)  -- It is very important that you take or use this exactly as directed.  Do not skip doses or discontinue unless directed by your doctor.  May cause drowsiness.  Alcohol may intensify this effect.  Use care when operating dangerous machinery.  Some non-prescription drugs may aggravate your condition.  Read all labels carefully.  If a warning appears, check with your doctor before taking.  Swallow whole.  Do not crush.  Take with food or milk.  This drug may impair the ability to drive or operate machinery.  Use care until you become familiar with its effects.    -- Indication: For Rapid atrial fibrillation    levothyroxine 25 mcg (0.025 mg) oral tablet  -- 1 tab(s) by mouth once a day  -- Indication: For Thyroid    oxybutynin 10 mg/24 hr oral tablet, extended release  -- 1 tab(s) by mouth once a day  -- Indication: For bladder incontinence I will START or STAY ON the medications listed below when I get home from the hospital:    Aspirin Low Dose 81 mg oral tablet, chewable  -- 1 tab(s) by mouth once a day  -- Indication: For stroke    tamsulosin 0.4 mg oral capsule  -- 1 cap(s) by mouth once a day  -- Indication: For bph    digoxin 250 mcg (0.25 mg) oral tablet  -- 1 tab(s) by mouth once a day  -- Indication: For Afib    Eliquis 5 mg oral tablet  -- 1 tab(s) by mouth every 12 hours  -- Indication: For Afib    Lipitor 40 mg oral tablet  -- 1 tab(s) by mouth once a day (at bedtime)  -- Indication: For stroke    ALPRAZolam 0.25 mg oral tablet  -- 1 tab(s) by mouth every 12 hours, As Needed -anxiety - for anxiety MDD:MDD 2tb  -- Indication: For Anxiety    Toprol- mg oral tablet, extended release  -- 1 tab(s) by mouth 2 times a day ( I know it is 200mg po BID)  -- It is very important that you take or use this exactly as directed.  Do not skip doses or discontinue unless directed by your doctor.  May cause drowsiness.  Alcohol may intensify this effect.  Use care when operating dangerous machinery.  Some non-prescription drugs may aggravate your condition.  Read all labels carefully.  If a warning appears, check with your doctor before taking.  Swallow whole.  Do not crush.  Take with food or milk.  This drug may impair the ability to drive or operate machinery.  Use care until you become familiar with its effects.    -- Indication: For Rapid atrial fibrillation    levothyroxine 25 mcg (0.025 mg) oral tablet  -- 1 tab(s) by mouth once a day  -- Indication: For Thyroid    oxybutynin 10 mg/24 hr oral tablet, extended release  -- 1 tab(s) by mouth once a day  -- Indication: For bladder incontinence

## 2019-02-01 NOTE — DISCHARGE NOTE ADULT - PLAN OF CARE
to not have any sympotms eliquis, ASA, statin, risk factor control to improve fx f/u cardiology as outpt for ICD in 3 months to control rate on metoprolol and digoxin to anticoagulate cont eliquis

## 2019-02-01 NOTE — PROGRESS NOTE ADULT - SUBJECTIVE AND OBJECTIVE BOX
HOSPITALIST ATTENDING PROGRESS NOTE    Chart and meds reviewed.  Patient seen and examined.    HPI: 64 yo male presents to the ED  with complain of episode of AMS today. Patient was at a party today when he suddenly began having slurred speech, facial droop, and diaphoresis. he also had +urinary incontinence. Episode lasted approximately 5 minutes, and then symptoms completely resolved. No LOC. Then he started to have SOB. He still felt SOB during his presentation in ED. As per patient, he has been "not feeling well" for a month, with palpitations and SOB described as "throat tightness." Patient was seen by PMD, was prescribed Augmentin for possible sinus infection, but with no relief of SOB. Patient has been taking Tylenol PM for symptoms and for sleep aid. Patient also has a cyst on his abd, for which he is on abx, doxycycline. Denies tobacco use, illicit drug use. Pt drank 1.5 glasses of wine at party today. Patient has been on a diet to lose weight, has decreased his portion sizes and decreased sugary and fatty foods in his diet, does not eat red meat. Exercises 3-4 days per week.    1/30/19 pt seen and examined, cath postponed due to being on eliquis. received AM dose, DC eliquis, start heparin gtt in evening, stop prior to cath.    1/31/19 awaiting cath today, HR uncontrolled with walking especially.     2/1/19 pt seen and examined, cath yesterday with non ischemic CAD, RHC with no elevated pressures, needs rate control.     All 10 systems reviewed and found to be negative with the exception of what has been described above.    MEDICATIONS  (STANDING):  apixaban 5 milliGRAM(s) Oral every 12 hours  aspirin  chewable 81 milliGRAM(s) Oral daily  atorvastatin 40 milliGRAM(s) Oral at bedtime  influenza   Vaccine 0.5 milliLiter(s) IntraMuscular once  levothyroxine 25 MICROGram(s) Oral daily  metoprolol tartrate 100 milliGRAM(s) Oral every 8 hours  oxybutynin 10 milliGRAM(s) Oral daily  tamsulosin 0.4 milliGRAM(s) Oral at bedtime  trimethoprim  160 mG/sulfamethoxazole 800 mG 1 Tablet(s) Oral two times a day    MEDICATIONS  (PRN):  metoprolol tartrate Injectable 5 milliGRAM(s) IV Push every 6 hours PRN HR >130      VITALS:  T(F): 97.6 (02-01-19 @ 10:30), Max: 97.7 (01-31-19 @ 18:40)  HR: 71 (02-01-19 @ 10:30) (71 - 117)  BP: 97/82 (02-01-19 @ 10:30) (97/79 - 116/86)  RR: 17 (02-01-19 @ 10:30) (17 - 19)  SpO2: 96% (02-01-19 @ 10:30) (96% - 100%)  Wt(kg): --    I&O's Summary    31 Jan 2019 07:01  -  01 Feb 2019 07:00  --------------------------------------------------------  IN: 240 mL / OUT: 0 mL / NET: 240 mL        CAPILLARY BLOOD GLUCOSE          PHYSICAL EXAM:    HEENT:  pupils equal and reactive, EOMI, no oropharyngeal lesions, erythema, exudates, oral thrush    NECK:   supple, no carotid bruits, no palpable lymph nodes, no thyromegaly    CV:  +S1, +S2, regular, no murmurs or rubs    RESP:   lungs clear to auscultation bilaterally, no wheezing, rales, rhonchi, good air entry bilaterally    BREAST:  not examined    GI:  abdomen soft, non-tender, non-distended, normal BS, no bruits, no abdominal masses, no palpable masses    RECTAL:  not examined    :  not examined    MSK:   normal muscle tone, no atrophy, no rigidity, no contractions    EXT:   no clubbing, no cyanosis, no edema, no calf pain, swelling or erythema    VASCULAR:  pulses equal and symmetric in the upper and lower extremities    NEURO:  AAOX3, no focal neurological deficits, follows all commands, able to move extremities spontaneously    SKIN:  no ulcers, lesions or rashes    LABS:                            16.9   4.90  )-----------( 146      ( 01 Feb 2019 06:10 )             50.9     02-01    143  |  111<H>  |  18  ----------------------------<  71  4.5   |  29  |  1.41<H>    Ca    8.5      01 Feb 2019 06:10    TPro  6.1  /  Alb  3.2<L>  /  TBili  1.2  /  DBili  x   /  AST  32  /  ALT  79<H>  /  AlkPhos  74  02-01        LIVER FUNCTIONS - ( 01 Feb 2019 06:10 )  Alb: 3.2 g/dL / Pro: 6.1 gm/dL / ALK PHOS: 74 U/L / ALT: 79 U/L / AST: 32 U/L / GGT: x           PTT - ( 31 Jan 2019 07:20 )  PTT:106.3 sec                                  CULTURES:

## 2019-02-01 NOTE — PROGRESS NOTE ADULT - ASSESSMENT
Slurred speech- Appreciate neurology team input.  MRI results noted.  He was noted to have small foci consistent with acute infarcts per MRI report.  Likely embolic in nature  Anticoagulation management as stated below.  Pt is on full dose anticoauglation now. Will hold off on MIHIR for now.    Afib with RVR -poor rate control.  continue metoprolol for now- dose increased to 100mg TID.  Digoxin iv given this am.  Continue ELliquis.       Newly diagnosed HFrEF- LVEF 18%. Dilated cardiomyopathy with wall motion abnormalities as stated above.  Non ischemic cardiomyopathy- LHC did not reveal any obstructive CAD.    GDMT for HFrEF.  WIll hold lisinopril to accomodate rate control agents.|  RHC findings did not reveal any elevated right sided or PCWP.   Will hold off diuretics and monitor renal function.   Appears euvolemic on exam.       HTN- meds as stated above.     Hyperlipdiemia- statin     Other medical issues- Management per primary team.  Thank you for allowing me to participate in the care of this patient. Please feel free to contact me with any questions.

## 2019-02-01 NOTE — DISCHARGE NOTE ADULT - CARE PLAN
Principal Discharge DX:	Cerebrovascular accident (CVA) due to embolism of cerebral artery  Goal:	to not have any sympotms  Assessment and plan of treatment:	eliquis, ASA, statin, risk factor control  Secondary Diagnosis:	Acute systolic congestive heart failure  Goal:	to improve fx  Assessment and plan of treatment:	f/u cardiology as outpt for ICD in 3 months  Secondary Diagnosis:	Rapid atrial fibrillation  Goal:	to control rate  Assessment and plan of treatment:	on metoprolol and digoxin  Secondary Diagnosis:	Left atrial thrombus  Goal:	to anticoagulate  Assessment and plan of treatment:	cont eliquis

## 2019-02-01 NOTE — DISCHARGE NOTE ADULT - PROVIDER TOKENS
PROVIDER:[TOKEN:[[id number]]],PROVIDER:[TOKEN:[[id number]]],PROVIDER:[TOKEN:[[id number]]],FREE:[LAST:[HIS PCP],PHONE:[(   )    -],FAX:[(   )    -]]

## 2019-02-01 NOTE — DISCHARGE NOTE ADULT - HOSPITAL COURSE
64 yo male presents to the ED  with complain of episode of AMS today. Patient was at a party today when he suddenly began having slurred speech, facial droop, and diaphoresis. he also had +urinary incontinence. Episode lasted approximately 5 minutes, and then symptoms completely resolved. No LOC. Then he started to have SOB. He still felt SOB during his presentation in ED. As per patient, he has been "not feeling well" for a month, with palpitations and SOB described as "throat tightness." Patient was seen by PMD, was prescribed Augmentin for possible sinus infection, but with no relief of SOB. Patient has been taking Tylenol PM for symptoms and for sleep aid. Patient also has a cyst on his abd, for which he is on abx, doxycycline. Denies tobacco use, illicit drug use. Pt drank 1.5 glasses of wine at party today. Patient has been on a diet to lose weight, has decreased his portion sizes and decreased sugary and fatty foods in his diet, does not eat red meat. Exercises 3-4 days per week.    Assessment and Plan:    1. 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 Afib: not controlled  - changed to metoprolol 150 bid  - digoxin IV given  - anticoagulation with Eliquis   - cardioversion on thursday    3. New onset systolic CHF EF 18%  - hold on ACEI as patient with KRISTYN and needs more adjustment with meds for rate control   - continue statin, beta blockers  - may need cardiology follow up as outpatient to see EF improves, if not will need ICD   - EP eval apprecaited no AICD    4. Erythrocytosis: hydrate po  - monitor cbc, hemeonc w/u as outpt    5. DC bactrim    6. Left Atrial thrombus on MIHIR today: continue eliquis  - Unable to cardiovert today    7. Anxiety: Pt anxious now, xanax prn    outpt cardio, neuro, PMD follow up  Patient is unable to exert/ambulate due to tachycardia. MIHIR with thrombus today. F/u cardio for planning      PHYSICAL EXAM:    Constitutional: NAD, awake and alert, well-developed  HEENT: PERR, EOMI, Normal Hearing, MMM  Neck: Soft and supple, No LAD, No JVD  Respiratory: Breath sounds are clear bilaterally, No wheezing, rales or rhonchi  Cardiovascular: S1 and S2, regular rate and rhythm, no Murmurs, gallops or rubs  Gastrointestinal: Bowel Sounds present, soft, nontender, nondistended, no guarding, no rebound  Extremities: No peripheral edema  Vascular: 2+ peripheral pulses  Neurological: A/O x 3, no focal deficits  Musculoskeletal: 5/5 strength b/l upper and lower extremities  Skin: No rashes      total time for discharge 55 mnutes 64 yo male presents to the ED  with complain of episode of AMS today. Patient was at a party today when he suddenly began having slurred speech, facial droop, and diaphoresis. he also had +urinary incontinence. Episode lasted approximately 5 minutes, and then symptoms completely resolved. No LOC. Then he started to have SOB. He still felt SOB during his presentation in ED. As per patient, he has been "not feeling well" for a month, with palpitations and SOB described as "throat tightness." Patient was seen by PMD, was prescribed Augmentin for possible sinus infection, but with no relief of SOB. Patient has been taking Tylenol PM for symptoms and for sleep aid. Patient also has a cyst on his abd, for which he is on abx, doxycycline. Denies tobacco use, illicit drug use. Pt drank 1.5 glasses of wine at party today. Patient has been on a diet to lose weight, has decreased his portion sizes and decreased sugary and fatty foods in his diet, does not eat red meat. Exercises 3-4 days per week.    2.8: no cp, no sob, no dyspnea on ambulation    Vital Signs Last 24 Hrs  T(C): 36.9 (08 Feb 2019 11:06), Max: 36.9 (08 Feb 2019 11:06)  T(F): 98.4 (08 Feb 2019 11:06), Max: 98.4 (08 Feb 2019 11:06)  HR: 78 (08 Feb 2019 11:06) (54 - 78)  BP: 98/65 (08 Feb 2019 11:06) (98/65 - 111/66)  RR: 18 (08 Feb 2019 11:06) (18 - 19)  SpO2: 97% (08 Feb 2019 11:06) (96% - 100%)    PHYSICAL EXAM:    GENERAL: NAD, Well nourished  HEENT:  NC/AT, EOMI, PERRLA, No scleral icterus, Moist mucous membranes  NECK: Supple, No JVD  CNS:  Alert & Oriented X3, Motor Strength 5/5 B/L upper and lower extremities; DTRs 2+ intact   LUNG: Normal Breath sounds, Clear to auscultation bilaterally, No rales, No rhonchi, No wheezing  HEART: RRR; No murmurs, No rubs  ABDOMEN: +BS, ST/ND/NT  GENITOURINARY- Voiding, Bladder not distended  EXTREMITIES:  2+ Peripheral Pulses, No clubbing, No cyanosis, No tibial edema  MUSCULOSKELTAL: Joints normal ROM, No joint tenderness, No muscle tenderness  RECTAL: deferred, not indicated  BREAST: deferred    Assessment and Plan:    1. 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 Afib: not controlled  - changed to metoprolol 100mg po Tid  - digoxin 250mcg daily   - anticoagulation with Eliquis    cannot perform ablation or DCCV due to atrial thrombus; f/u with EP/Cardiology as outpt  Advised pt to avoid strenuous activity    3. New onset systolic CHF EF 18%  - hold on ACEI as patient with KRISTYN and needs more adjustment with meds for rate control   - continue statin, beta blockers; avoit CACh Blockers  - cardiology follow up as outpatient to see EF improves, if not will need ICD   - EP eval apprecaited no AICD    4. Erythrocytosis: hydrate po  - monitor cbc, hemeonc w/u as outpt    5. Subcutaneus  infected csyt: treated with  bactrim    6. Left Atrial thrombus on MIHIR today: continue eliquis    7. Anxiety:  xanax prn  outpt cardio, neuro, PMD follow up    d/c home, time 38min 62 yo male presents to the ED  with complain of episode of AMS today. Patient was at a party today when he suddenly began having slurred speech, facial droop, and diaphoresis. he also had +urinary incontinence. Episode lasted approximately 5 minutes, and then symptoms completely resolved. No LOC. Then he started to have SOB. He still felt SOB during his presentation in ED. As per patient, he has been "not feeling well" for a month, with palpitations and SOB described as "throat tightness." Patient was seen by PMD, was prescribed Augmentin for possible sinus infection, but with no relief of SOB. Patient has been taking Tylenol PM for symptoms and for sleep aid. Patient also has a cyst on his abd, for which he is on abx, doxycycline. Denies tobacco use, illicit drug use. Pt drank 1.5 glasses of wine at party today. Patient has been on a diet to lose weight, has decreased his portion sizes and decreased sugary and fatty foods in his diet, does not eat red meat. Exercises 3-4 days per week.    2.8: no cp, no sob, no dyspnea on ambulation  2.9: patient ambulates asymptomatic, HR 120s-130s, no cp, no sob      Vital Signs Last 24 Hrs  T(C): 36.4 (09 Feb 2019 10:11), Max: 37.1 (09 Feb 2019 05:03)  T(F): 97.6 (09 Feb 2019 10:11), Max: 98.7 (09 Feb 2019 05:03)  HR: 74 (09 Feb 2019 10:11) (68 - 87)  BP: 99/58 (09 Feb 2019 10:11) (96/71 - 105/58)  RR: 18 (09 Feb 2019 10:11) (17 - 19)  SpO2: 100% (09 Feb 2019 10:11) (97% - 100%)    PHYSICAL EXAM:    GENERAL: NAD, Well nourished  HEENT:  NC/AT, EOMI, PERRLA, No scleral icterus, Moist mucous membranes  NECK: Supple, No JVD  CNS:  Alert & Oriented X3, Motor Strength 5/5 B/L upper and lower extremities; DTRs 2+ intact   LUNG: Normal Breath sounds, Clear to auscultation bilaterally, No rales, No rhonchi, No wheezing  HEART: RRR; No murmurs, No rubs  ABDOMEN: +BS, ST/ND/NT  GENITOURINARY- Voiding, Bladder not distended  EXTREMITIES:  2+ Peripheral Pulses, No clubbing, No cyanosis, No tibial edema  MUSCULOSKELTAL: Joints normal ROM, No joint tenderness, No muscle tenderness  RECTAL: deferred, not indicated  BREAST: deferred    Assessment and Plan:    1. 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 Afib: not controlled  - Metoprolol d/c'ed, started on Toprol XL 200mg po Bid  - digoxin 250mcg daily   - anticoagulation with Eliquis    cannot perform ablation or DCCV due to atrial thrombus; f/u with EP/Cardiology as outpt  Advised pt to avoid strenuous activity  Advised to pursue [facility] evaluation as outpt, will need sleep study    3. New onset systolic CHF EF 18%  - hold on ACEI as patient with KRISTYN and needs more adjustment with meds for rate control   - continue statin, beta blockers; avoit CACh Blockers  - cardiology follow up as outpatient to see EF improves, if not will need ICD   - EP eval apprecaited no AICD    4. Erythrocytosis: hydrate po  - monitor cbc, hemeonc w/u as outpt    5. Subcutaneus  infected csyt: treated with  bactrim    6. Left Atrial thrombus on MIHIR today: continue eliquis    7. Anxiety:  xanax prn  outpt cardio, neuro, PMD follow up    d/c home, time 38min

## 2019-02-01 NOTE — PROGRESS NOTE ADULT - SUBJECTIVE AND OBJECTIVE BOX
Patient is a 63y old  Male who presents with a chief complaint of complain of slurry speech and altered mental status.       HPI:  64 yo male presents to the ED  with complain of episode of AMS today. Patient was at a party today when he suddenly began having slurred speech, facial droop, and diaphoresis. he also had +urinary incontinence. Episode lasted approximately 5 minutes, and then symptoms completely resolved. No LOC.  During the hosptial course he was noted to have afib wtih RVR and was started on cardizem drip.  He was also started on heparin drip for anticoagulation    - pt seen and examined by me today. Pt denies any new symptoms today.   - pt seen and examined by me today. c/o anxiety    - pt seen and examined by me today. Pt denies any CP or SOB.   - pt seen and examined by me today. Pt denies any CP or SOB.            Family Hx:  Mother: Alive, 91 yr, had colon cancer, DM, Aortic valve replacement  Father:  from Stomach cancer    PMHx:  BPH  Hypothyroidism     PSHx:  No recent surgeries. (2019 00:15)      PAST MEDICAL & SURGICAL HISTORY:  HLD (hyperlipidemia)  Hypercholesterolemia: controlled by diet and exercise  HTN - Hypertension: borderline controlled by diet/  exercise  Left Ear Excision of Cholesteotoma:   Left Inguinal Hernia Repair: @ 8 years old      MEDICATIONS  (STANDING):  diazepam    Tablet 2 milliGRAM(s) Oral once  diltiazem    Tablet 30 milliGRAM(s) Oral every 6 hours  diltiazem Infusion 10 mG/Hr (10 mL/Hr) IV Continuous <Continuous>  doxycycline hyclate Capsule 100 milliGRAM(s) Oral every 12 hours  heparin  Infusion.  Unit(s)/Hr (20 mL/Hr) IV Continuous <Continuous>  influenza   Vaccine 0.5 milliLiter(s) IntraMuscular once  levothyroxine 25 MICROGram(s) Oral daily  metoprolol tartrate 25 milliGRAM(s) Oral two times a day  oxybutynin 10 milliGRAM(s) Oral daily  tamsulosin 0.4 milliGRAM(s) Oral at bedtime    MEDICATIONS  (PRN):  heparin  Injectable 9000 Unit(s) IV Push every 6 hours PRN For aPTT less than 40  heparin  Injectable 4500 Unit(s) IV Push every 6 hours PRN For aPTT between 40 - 57      FAMILY HISTORY:      SOCIAL HISTORY:   ex smoker    REVIEW OF SYSTEMS:  CONSTITUTIONAL:    No fatigue, malaise, lethargy.  No fever or chills.  HEENT:  Eyes:  No visual changes.     ENT:  No epistaxis.  No sinus pain.    RESPIRATORY:  No cough.  No wheeze.  No hemoptysis.  No shortness of breath.  CARDIOVASCULAR:  No chest pains.  No palpitations. No shortness of breath, No orthopnea or PND.  GASTROINTESTINAL:  No abdominal pain.  No nausea or vomiting.    GENITOURINARY:    No hematuria.    MUSCULOSKELETAL:  No musculoskeletal pain.  No joint swelling.  No arthritis.  NEUROLOGICAL:  No tingling or numbness or weakness.  PSYCHIATRIC:  No confusion  SKIN:  No rashes.    ENDOCRINE:  No unexplained weight loss.  No polydipsia.   HEMATOLOGIC:  No anemia.  No prolonged or excessive bleeding.   ALLERGIC AND IMMUNOLOGIC:  No pruritus.          Vital Signs Last 24 Hrs  T(C): 36.7 (2019 04:36), Max: 36.8 (2019 17:14)  T(F): 98 (2019 04:36), Max: 98.2 (2019 17:14)  HR: 72 (2019 04:36) (72 - 117)  BP: 108/70 (2019 04:36) (106/59 - 121/73)  BP(mean): --  RR: 18 (2019 17:14) (18 - 18)  SpO2: 98% (2019 04:36) (96% - 99%)    PHYSICAL EXAM-    Constitutional: no acute distress    Head: Head is normocephalic and atraumatic.      Neck:  There are strong carotid pulses bilaterally. No JVD.     Cardiovascular: irregular rate and tachycardic.      Respiratory: Breathsounds are normal. No rales. No wheezing.    Abdomen: Soft, nontender, nondistended with positive bowel sounds.      Extremity: No tenderness. No  pitting edema     Neurologic: The patient is alert and oriented.      Skin: No rash, no obvious lesions noted.      Psychiatric: The patient appears to be emotionally stable.      INTERPRETATION OF TELEMETRY: afib 100/min    ECG:     I&O's Detail    2019 07:01  -  2019 07:00  --------------------------------------------------------  IN:    diltiazem Infusion: 248 mL    heparin  Infusion.: 387.9 mL  Total IN: 635.9 mL    OUT:  Total OUT: 0 mL    Total NET: 635.9 mL          LABS:                        15.5   6.55  )-----------( 155      ( 2019 06:55 )             46.6         145  |  117<H>  |  13  ----------------------------<  97  4.1   |  21<L>  |  1.25    Ca    8.8      2019 09:22  Mg     1.9         TPro  6.4  /  Alb  3.4  /  TBili  1.8<H>  /  DBili  x   /  AST  43<H>  /  ALT  126<H>  /  AlkPhos  68      CARDIAC MARKERS ( 2019 09:22 )  0.040 ng/mL / x     / x     / x     / x      CARDIAC MARKERS ( 2019 17:53 )  0.029 ng/mL / x     / x     / x     / x          PT/INR - ( 2019 17:53 )   PT: 16.5 sec;   INR: 1.47 ratio         PTT - ( 2019 06:56 )  PTT:84.3 sec  Urinalysis Basic - ( 2019 19:30 )    Color: Yellow / Appearance: Clear / S.025 / pH: x  Gluc: x / Ketone: Negative  / Bili: Negative / Urobili: Negative mg/dL   Blood: x / Protein: 15 mg/dL / Nitrite: Negative   Leuk Esterase: Trace / RBC: Negative /HPF / WBC 3-5   Sq Epi: x / Non Sq Epi: Occasional / Bacteria: Negative      I&O's Summary    2019 07:01  -  2019 07:00  --------------------------------------------------------  IN: 635.9 mL / OUT: 0 mL / NET: 635.9 mL      BNP  RADIOLOGY & ADDITIONAL STUDIES:  < from: CT Head No Cont (19 @ 18:17) >  IMPRESSION:     No acute intracranial bleeding, mass effect, or shift. Mild chronic   microvascular ischemic changes.                 NOEMI PEREIRA   This document has been electronically signed. 2019  6:26PM    < end of copied text >  < from: Transthoracic Echocardiogram (19 @ 12:02) >   Summary     The left ventricle cavity is mildly dilated. Left ventricle systolic   function appears severely impaired globally along with segmental wall   motion abnormalities noted. Severe hypokinesis of the inferior,   inferoseptum, anteroseptum and anterior walls. The apical cap is   hypokinetic and free of thrombus. Estimated ejection fraction is 18% via   bi-plane method.   The left atrium is moderately dilated.   The right atrium appears borderline dilated.   The right ventricle is borderline dilated with mildly impaired function.   The aortic valve is well visualized, appears mildly sclerotic. Aortic   valve leaflet excursion is preserved.   Trace aortic regurgitation is present.   The mitral valve leaflets are well seen thin and pliable; preserved   leaflet excursion noted. Trace to mild mitral valve regurgitation noted.   Redundant chordae noted.   EA reversal of the mitral inflow consistent with reduced compliance of   the   left ventricle (When appreciable.)   The tricuspid valve leaflets are well seen and appear thin and pliable   with preserved leaflets excursion. Trace tricuspid regurgitation noted.   The pulmonic valve leaflets appear thin and pliable. Trace to mild   pulmonic regurgitation noted.   No evidence of pericardial effusion.   No evidence of pleural effusion.     Signature     ----------------------------------------------------------------   Electronically signed by Mark Dueñas MD(Interpreting   physician) on 2019 05:48 PM   ----------------------------------------------------------------    < end of copied text >  < from: MR Head No Cont (19 @ 10:16) >  IMPRESSION:    small subcentimeter foci of restricted diffusion in the   RIGHT insular cortex and RIGHT parietal cortex stent with acute   infarctions. Bilateral mastoiditis is noted.                ELSY HATCH M.D., ATTENDING RADIOLOGIST  This document has been electronically signed. 2019 10:42AM    < end of copied text >

## 2019-02-01 NOTE — DISCHARGE NOTE ADULT - CARE PROVIDER_API CALL
Arline Brunson)  Cardiovascular Disease; Internal Medicine  [address] [place] [address]  Phone: [phone]  Fax: [phone]  Follow Up Time:     Chip Mitchell)  Cardiac Electrophysiology; Cardiovascular Disease  [address] [place] [address]  Phone: [phone]  Fax: [phone]  Follow Up Time:     Giovanny Montes)  Internal Medicine  [address] [place] [address]  Phone: [phone]  Fax: [phone]  Follow Up Time:     HIS PCP,   Phone: (   )    -  Fax: (   )    -  Follow Up Time:

## 2019-02-01 NOTE — DISCHARGE NOTE ADULT - NS AS DC FU [facility] CONTRAINDICATIONS ACEI/ARB MEDS
worsening renal function/renal disease/dysfunction hypotension/worsening renal function/renal disease/dysfunction

## 2019-02-01 NOTE — PROGRESS NOTE ADULT - ASSESSMENT
Assessment and Plan:    1. 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 Afib: not controlled  - Beta blockers increased  - digoxin initiated  - anticoagulation with Eliquis     3. Abd cyst:  cw Bactrim    4. New onset systolic CHF EF 18%  - hold on ACEI as patient with KRISTYN and needs more adjustment with meds for rate control   - continue statin, beta blockers  - may need cardiology follow up as outpatient to see EF improves, if not will need ICD     outpt cardio, neuro, PMD follow up

## 2019-02-01 NOTE — DISCHARGE NOTE ADULT - CARE PROVIDERS DIRECT ADDRESSES
,DirectAddress_Unknown,[email or web address],DirectAddress_Unknown,DirectAddress_Unknown

## 2019-02-01 NOTE — DISCHARGE NOTE ADULT - MEDICATION SUMMARY - MEDICATIONS TO STOP TAKING
I will STOP taking the medications listed below when I get home from the hospital:    doxycycline hyclate 100 mg oral capsule  -- 1 cap(s) by mouth 2 times a day x14 days     **Did not complete course - course would end on 2/4/19**

## 2019-02-01 NOTE — DISCHARGE NOTE ADULT - PATIENT PORTAL LINK FT
You can access the [facility] Patient Portal, offered by [facility], by registering with the following website: [email or web address]

## 2019-02-02 LAB
ALBUMIN SERPL ELPH-MCNC: 3.4 G/DL — SIGNIFICANT CHANGE UP (ref 3.3–5)
ALP SERPL-CCNC: 84 U/L — SIGNIFICANT CHANGE UP (ref 40–120)
ALT FLD-CCNC: 82 U/L — HIGH (ref 12–78)
ANION GAP SERPL CALC-SCNC: 5 MMOL/L — SIGNIFICANT CHANGE UP (ref 5–17)
AST SERPL-CCNC: 34 U/L — SIGNIFICANT CHANGE UP (ref 15–37)
BILIRUB SERPL-MCNC: 0.9 MG/DL — SIGNIFICANT CHANGE UP (ref 0.2–1.2)
BUN SERPL-MCNC: 20 MG/DL — SIGNIFICANT CHANGE UP (ref 7–23)
CALCIUM SERPL-MCNC: 8.9 MG/DL — SIGNIFICANT CHANGE UP (ref 8.5–10.1)
CHLORIDE SERPL-SCNC: 110 MMOL/L — HIGH (ref 96–108)
CO2 SERPL-SCNC: 28 MMOL/L — SIGNIFICANT CHANGE UP (ref 22–31)
CREAT SERPL-MCNC: 1.42 MG/DL — HIGH (ref 0.5–1.3)
GLUCOSE SERPL-MCNC: 95 MG/DL — SIGNIFICANT CHANGE UP (ref 70–99)
HCT VFR BLD CALC: 50.9 % — HIGH (ref 39–50)
HGB BLD-MCNC: 17 G/DL — SIGNIFICANT CHANGE UP (ref 13–17)
MCHC RBC-ENTMCNC: 29.3 PG — SIGNIFICANT CHANGE UP (ref 27–34)
MCHC RBC-ENTMCNC: 33.4 GM/DL — SIGNIFICANT CHANGE UP (ref 32–36)
MCV RBC AUTO: 87.6 FL — SIGNIFICANT CHANGE UP (ref 80–100)
NRBC # BLD: 0 /100 WBCS — SIGNIFICANT CHANGE UP (ref 0–0)
PLATELET # BLD AUTO: 157 K/UL — SIGNIFICANT CHANGE UP (ref 150–400)
POTASSIUM SERPL-MCNC: 4.4 MMOL/L — SIGNIFICANT CHANGE UP (ref 3.5–5.3)
POTASSIUM SERPL-SCNC: 4.4 MMOL/L — SIGNIFICANT CHANGE UP (ref 3.5–5.3)
PROT SERPL-MCNC: 6.5 GM/DL — SIGNIFICANT CHANGE UP (ref 6–8.3)
RBC # BLD: 5.81 M/UL — HIGH (ref 4.2–5.8)
RBC # FLD: 12.8 % — SIGNIFICANT CHANGE UP (ref 10.3–14.5)
SODIUM SERPL-SCNC: 143 MMOL/L — SIGNIFICANT CHANGE UP (ref 135–145)
WBC # BLD: 5.57 K/UL — SIGNIFICANT CHANGE UP (ref 3.8–10.5)
WBC # FLD AUTO: 5.57 K/UL — SIGNIFICANT CHANGE UP (ref 3.8–10.5)

## 2019-02-02 RX ORDER — METOPROLOL TARTRATE 50 MG
100 TABLET ORAL
Qty: 0 | Refills: 0 | Status: DISCONTINUED | OUTPATIENT
Start: 2019-02-02 | End: 2019-02-03

## 2019-02-02 RX ORDER — DIGOXIN 250 MCG
0.25 TABLET ORAL ONCE
Qty: 0 | Refills: 0 | Status: COMPLETED | OUTPATIENT
Start: 2019-02-02 | End: 2019-02-02

## 2019-02-02 RX ORDER — METOPROLOL TARTRATE 50 MG
100 TABLET ORAL DAILY
Qty: 0 | Refills: 0 | Status: DISCONTINUED | OUTPATIENT
Start: 2019-02-02 | End: 2019-02-02

## 2019-02-02 RX ADMIN — APIXABAN 5 MILLIGRAM(S): 2.5 TABLET, FILM COATED ORAL at 18:09

## 2019-02-02 RX ADMIN — Medication 10 MILLIGRAM(S): at 11:35

## 2019-02-02 RX ADMIN — TAMSULOSIN HYDROCHLORIDE 0.4 MILLIGRAM(S): 0.4 CAPSULE ORAL at 21:31

## 2019-02-02 RX ADMIN — Medication 1 TABLET(S): at 05:49

## 2019-02-02 RX ADMIN — Medication 25 MICROGRAM(S): at 05:49

## 2019-02-02 RX ADMIN — Medication 5 MILLIGRAM(S): at 14:48

## 2019-02-02 RX ADMIN — Medication 100 MILLIGRAM(S): at 05:49

## 2019-02-02 RX ADMIN — Medication 1 TABLET(S): at 18:09

## 2019-02-02 RX ADMIN — APIXABAN 5 MILLIGRAM(S): 2.5 TABLET, FILM COATED ORAL at 05:49

## 2019-02-02 RX ADMIN — ATORVASTATIN CALCIUM 40 MILLIGRAM(S): 80 TABLET, FILM COATED ORAL at 21:31

## 2019-02-02 RX ADMIN — Medication 100 MILLIGRAM(S): at 08:07

## 2019-02-02 RX ADMIN — Medication 0.25 MILLIGRAM(S): at 08:08

## 2019-02-02 RX ADMIN — Medication 0.25 MILLIGRAM(S): at 18:41

## 2019-02-02 RX ADMIN — Medication 81 MILLIGRAM(S): at 11:35

## 2019-02-02 RX ADMIN — Medication 100 MILLIGRAM(S): at 18:42

## 2019-02-02 NOTE — PROGRESS NOTE ADULT - ASSESSMENT
Slurred speech- Appreciate neurology team input.  MRI results noted.  He was noted to have small foci consistent with acute infarcts per MRI report.  Likely embolic in nature  Anticoagulation management as stated below.  Pt is on full dose anticoauglation now. Will hold off on MIHIR for now.    Afib with RVR -poor rate control.  switch to toprol.  Digoxin iv today  Continue ELliquis.       Newly diagnosed HFrEF- LVEF 18%. Dilated cardiomyopathy with wall motion abnormalities as stated above.  Non ischemic cardiomyopathy- LHC did not reveal any obstructive CAD.    GDMT for HFrEF as stated above.   WIll hold lisinopril to accomodate rate control agents.|  He will need lifevest prior to discharge.   Appears euvolemic on exam.       HTN- meds as stated above.     Hyperlipdiemia- statin     Other medical issues- Management per primary team.  Thank you for allowing me to participate in the care of this patient. Please feel free to contact me with any questions.

## 2019-02-02 NOTE — PROGRESS NOTE ADULT - SUBJECTIVE AND OBJECTIVE BOX
HOSPITALIST ATTENDING PROGRESS NOTE    Chart and meds reviewed.  Patient seen and examined.    HPI: 62 yo male presents to the ED  with complain of episode of AMS today. Patient was at a party today when he suddenly began having slurred speech, facial droop, and diaphoresis. he also had +urinary incontinence. Episode lasted approximately 5 minutes, and then symptoms completely resolved. No LOC. Then he started to have SOB. He still felt SOB during his presentation in ED. As per patient, he has been "not feeling well" for a month, with palpitations and SOB described as "throat tightness." Patient was seen by PMD, was prescribed Augmentin for possible sinus infection, but with no relief of SOB. Patient has been taking Tylenol PM for symptoms and for sleep aid. Patient also has a cyst on his abd, for which he is on abx, doxycycline. Denies tobacco use, illicit drug use. Pt drank 1.5 glasses of wine at party today. Patient has been on a diet to lose weight, has decreased his portion sizes and decreased sugary and fatty foods in his diet, does not eat red meat. Exercises 3-4 days per week.    1/30/19 pt seen and examined, cath postponed due to being on eliquis. received AM dose, DC eliquis, start heparin gtt in evening, stop prior to cath.    1/31/19 awaiting cath today, HR uncontrolled with walking especially.     2/1/19 pt seen and examined, cath yesterday with non ischemic CAD, RHC with no elevated pressures, needs rate control.     2/2/19 feels well, rate not controlled, especially with ambulation, meds changed by cardio, digoxin today IV, changed to toprol      All 10 systems reviewed and found to be negative with the exception of what has been described above.    MEDICATIONS  (STANDING):  apixaban 5 milliGRAM(s) Oral every 12 hours  aspirin  chewable 81 milliGRAM(s) Oral daily  atorvastatin 40 milliGRAM(s) Oral at bedtime  influenza   Vaccine 0.5 milliLiter(s) IntraMuscular once  levothyroxine 25 MICROGram(s) Oral daily  metoprolol succinate  milliGRAM(s) Oral daily  oxybutynin 10 milliGRAM(s) Oral daily  tamsulosin 0.4 milliGRAM(s) Oral at bedtime  trimethoprim  160 mG/sulfamethoxazole 800 mG 1 Tablet(s) Oral two times a day    MEDICATIONS  (PRN):  metoprolol tartrate Injectable 5 milliGRAM(s) IV Push every 6 hours PRN HR >130      VITALS:  T(F): 98.2 (02-02-19 @ 11:29), Max: 98.2 (02-02-19 @ 11:29)  HR: 78 (02-02-19 @ 11:29) (58 - 98)  BP: 134/96 (02-02-19 @ 11:29) (99/60 - 134/96)  RR: 18 (02-02-19 @ 11:29) (18 - 18)  SpO2: 100% (02-02-19 @ 11:29) (94% - 100%)  Wt(kg): --    I&O's Summary    01 Feb 2019 07:01  -  02 Feb 2019 07:00  --------------------------------------------------------  IN: 120 mL / OUT: 0 mL / NET: 120 mL        CAPILLARY BLOOD GLUCOSE          PHYSICAL EXAM:    HEENT:  pupils equal and reactive, EOMI, no oropharyngeal lesions, erythema, exudates, oral thrush    NECK:   supple, no carotid bruits, no palpable lymph nodes, no thyromegaly    CV:  +S1, +S2, regular, no murmurs or rubs    RESP:   lungs clear to auscultation bilaterally, no wheezing, rales, rhonchi, good air entry bilaterally    BREAST:  not examined    GI:  abdomen soft, non-tender, non-distended, normal BS, no bruits, no abdominal masses, no palpable masses    RECTAL:  not examined    :  not examined    MSK:   normal muscle tone, no atrophy, no rigidity, no contractions    EXT:   no clubbing, no cyanosis, no edema, no calf pain, swelling or erythema    VASCULAR:  pulses equal and symmetric in the upper and lower extremities    NEURO:  AAOX3, no focal neurological deficits, follows all commands, able to move extremities spontaneously    SKIN:  no ulcers, lesions or rashes    LABS:                            17.0   5.57  )-----------( 157      ( 02 Feb 2019 05:56 )             50.9     02-02    143  |  110<H>  |  20  ----------------------------<  95  4.4   |  28  |  1.42<H>    Ca    8.9      02 Feb 2019 05:56    TPro  6.5  /  Alb  3.4  /  TBili  0.9  /  DBili  x   /  AST  34  /  ALT  82<H>  /  AlkPhos  84  02-02        LIVER FUNCTIONS - ( 02 Feb 2019 05:56 )  Alb: 3.4 g/dL / Pro: 6.5 gm/dL / ALK PHOS: 84 U/L / ALT: 82 U/L / AST: 34 U/L / GGT: x                                             CULTURES:

## 2019-02-02 NOTE — PROGRESS NOTE ADULT - SUBJECTIVE AND OBJECTIVE BOX
Patient is a 63y old  Male who presents with a chief complaint of complain of slurry speech and altered mental status.       HPI:  64 yo male presents to the ED  with complain of episode of AMS today. Patient was at a party today when he suddenly began having slurred speech, facial droop, and diaphoresis. he also had +urinary incontinence. Episode lasted approximately 5 minutes, and then symptoms completely resolved. No LOC.  During the hosptial course he was noted to have afib wtih RVR and was started on cardizem drip.  He was also started on heparin drip for anticoagulation    - pt seen and examined by me today. Pt denies any new symptoms today.   - pt seen and examined by me today. c/o anxiety    - pt seen and examined by me today. Pt denies any CP or SOB.   - pt seen and examined by me today. Pt denies any CP or SOB.    - pt seen and examined by me today. denies any symptoms.         Family Hx:  Mother: Alive, 91 yr, had colon cancer, DM, Aortic valve replacement  Father:  from Stomach cancer    PMHx:  BPH  Hypothyroidism     PSHx:  No recent surgeries. (2019 00:15)      PAST MEDICAL & SURGICAL HISTORY:  HLD (hyperlipidemia)  Hypercholesterolemia: controlled by diet and exercise  HTN - Hypertension: borderline controlled by diet/  exercise  Left Ear Excision of Cholesteotoma:   Left Inguinal Hernia Repair: @ 8 years old      MEDICATIONS  (STANDING):  diazepam    Tablet 2 milliGRAM(s) Oral once  diltiazem    Tablet 30 milliGRAM(s) Oral every 6 hours  diltiazem Infusion 10 mG/Hr (10 mL/Hr) IV Continuous <Continuous>  doxycycline hyclate Capsule 100 milliGRAM(s) Oral every 12 hours  heparin  Infusion.  Unit(s)/Hr (20 mL/Hr) IV Continuous <Continuous>  influenza   Vaccine 0.5 milliLiter(s) IntraMuscular once  levothyroxine 25 MICROGram(s) Oral daily  metoprolol tartrate 25 milliGRAM(s) Oral two times a day  oxybutynin 10 milliGRAM(s) Oral daily  tamsulosin 0.4 milliGRAM(s) Oral at bedtime    MEDICATIONS  (PRN):  heparin  Injectable 9000 Unit(s) IV Push every 6 hours PRN For aPTT less than 40  heparin  Injectable 4500 Unit(s) IV Push every 6 hours PRN For aPTT between 40 - 57      FAMILY HISTORY:      SOCIAL HISTORY:   ex smoker    REVIEW OF SYSTEMS:  CONSTITUTIONAL:    No fatigue, malaise, lethargy.  No fever or chills.  HEENT:  Eyes:  No visual changes.     ENT:  No epistaxis.  No sinus pain.    RESPIRATORY:  No cough.  No wheeze.  No hemoptysis.  No shortness of breath.  CARDIOVASCULAR:  No chest pains.  No palpitations. No shortness of breath, No orthopnea or PND.  GASTROINTESTINAL:  No abdominal pain.  No nausea or vomiting.    GENITOURINARY:    No hematuria.    MUSCULOSKELETAL:  No musculoskeletal pain.  No joint swelling.  No arthritis.  NEUROLOGICAL:  No tingling or numbness or weakness.  PSYCHIATRIC:  No confusion  SKIN:  No rashes.    ENDOCRINE:  No unexplained weight loss.  No polydipsia.   HEMATOLOGIC:  No anemia.  No prolonged or excessive bleeding.   ALLERGIC AND IMMUNOLOGIC:  No pruritus.          Vital Signs Last 24 Hrs  T(C): 36.7 (2019 04:36), Max: 36.8 (2019 17:14)  T(F): 98 (2019 04:36), Max: 98.2 (2019 17:14)  HR: 72 (2019 04:36) (72 - 117)  BP: 108/70 (2019 04:36) (106/59 - 121/73)  BP(mean): --  RR: 18 (2019 17:14) (18 - 18)  SpO2: 98% (2019 04:36) (96% - 99%)    PHYSICAL EXAM-    Constitutional: no acute distress    Head: Head is normocephalic and atraumatic.      Neck:  There are strong carotid pulses bilaterally. No JVD.     Cardiovascular: irregular rate and tachycardic.      Respiratory: Breathsounds are normal. No rales. No wheezing.    Abdomen: Soft, nontender, nondistended with positive bowel sounds.      Extremity: No tenderness. No  pitting edema     Neurologic: The patient is alert and oriented.      Skin: No rash, no obvious lesions noted.      Psychiatric: The patient appears to be emotionally stable.      INTERPRETATION OF TELEMETRY: afib 100/min    ECG:     I&O's Detail    2019 07:01  -  2019 07:00  --------------------------------------------------------  IN:    diltiazem Infusion: 248 mL    heparin  Infusion.: 387.9 mL  Total IN: 635.9 mL    OUT:  Total OUT: 0 mL    Total NET: 635.9 mL          LABS:                        15.5   6.55  )-----------( 155      ( 2019 06:55 )             46.6         145  |  117<H>  |  13  ----------------------------<  97  4.1   |  21<L>  |  1.25    Ca    8.8      2019 09:22  Mg     1.9         TPro  6.4  /  Alb  3.4  /  TBili  1.8<H>  /  DBili  x   /  AST  43<H>  /  ALT  126<H>  /  AlkPhos  68      CARDIAC MARKERS ( 2019 09:22 )  0.040 ng/mL / x     / x     / x     / x      CARDIAC MARKERS ( 2019 17:53 )  0.029 ng/mL / x     / x     / x     / x          PT/INR - ( 2019 17:53 )   PT: 16.5 sec;   INR: 1.47 ratio         PTT - ( 2019 06:56 )  PTT:84.3 sec  Urinalysis Basic - ( 2019 19:30 )    Color: Yellow / Appearance: Clear / S.025 / pH: x  Gluc: x / Ketone: Negative  / Bili: Negative / Urobili: Negative mg/dL   Blood: x / Protein: 15 mg/dL / Nitrite: Negative   Leuk Esterase: Trace / RBC: Negative /HPF / WBC 3-5   Sq Epi: x / Non Sq Epi: Occasional / Bacteria: Negative      I&O's Summary    2019 07:01  -  2019 07:00  --------------------------------------------------------  IN: 635.9 mL / OUT: 0 mL / NET: 635.9 mL      BNP  RADIOLOGY & ADDITIONAL STUDIES:  < from: CT Head No Cont (19 @ 18:17) >  IMPRESSION:     No acute intracranial bleeding, mass effect, or shift. Mild chronic   microvascular ischemic changes.                 NOEMI PEREIRA   This document has been electronically signed. 2019  6:26PM    < end of copied text >  < from: Transthoracic Echocardiogram (19 @ 12:02) >   Summary     The left ventricle cavity is mildly dilated. Left ventricle systolic   function appears severely impaired globally along with segmental wall   motion abnormalities noted. Severe hypokinesis of the inferior,   inferoseptum, anteroseptum and anterior walls. The apical cap is   hypokinetic and free of thrombus. Estimated ejection fraction is 18% via   bi-plane method.   The left atrium is moderately dilated.   The right atrium appears borderline dilated.   The right ventricle is borderline dilated with mildly impaired function.   The aortic valve is well visualized, appears mildly sclerotic. Aortic   valve leaflet excursion is preserved.   Trace aortic regurgitation is present.   The mitral valve leaflets are well seen thin and pliable; preserved   leaflet excursion noted. Trace to mild mitral valve regurgitation noted.   Redundant chordae noted.   EA reversal of the mitral inflow consistent with reduced compliance of   the   left ventricle (When appreciable.)   The tricuspid valve leaflets are well seen and appear thin and pliable   with preserved leaflets excursion. Trace tricuspid regurgitation noted.   The pulmonic valve leaflets appear thin and pliable. Trace to mild   pulmonic regurgitation noted.   No evidence of pericardial effusion.   No evidence of pleural effusion.     Signature     ----------------------------------------------------------------   Electronically signed by Mark Dueñas MD(Interpreting   physician) on 2019 05:48 PM   ----------------------------------------------------------------    < end of copied text >  < from: MR Head No Cont (19 @ 10:16) >  IMPRESSION:    small subcentimeter foci of restricted diffusion in the   RIGHT insular cortex and RIGHT parietal cortex stent with acute   infarctions. Bilateral mastoiditis is noted.                ELSY HATCH M.D., ATTENDING RADIOLOGIST  This document has been electronically signed. 2019 10:42AM    < end of copied text >

## 2019-02-02 NOTE — PROGRESS NOTE ADULT - ASSESSMENT
Assessment and Plan:    1. 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 Afib: not controlled  - changed to toprol XL 100mg daily  - digoxin IV given  - anticoagulation with Eliquis     3. Abd cyst:  cw Bactrim    4. New onset systolic CHF EF 18%  - hold on ACEI as patient with KRISTYN and needs more adjustment with meds for rate control   - continue statin, beta blockers  - may need cardiology follow up as outpatient to see EF improves, if not will need ICD   - ? Life vest per cardio --> EP evaluation    outpt cardio, neuro, PMD follow up  possible DC tomm if rate better

## 2019-02-03 LAB
ALBUMIN SERPL ELPH-MCNC: 3.4 G/DL — SIGNIFICANT CHANGE UP (ref 3.3–5)
ALP SERPL-CCNC: 85 U/L — SIGNIFICANT CHANGE UP (ref 40–120)
ALT FLD-CCNC: 90 U/L — HIGH (ref 12–78)
ANION GAP SERPL CALC-SCNC: 5 MMOL/L — SIGNIFICANT CHANGE UP (ref 5–17)
AST SERPL-CCNC: 41 U/L — HIGH (ref 15–37)
BILIRUB SERPL-MCNC: 1 MG/DL — SIGNIFICANT CHANGE UP (ref 0.2–1.2)
BUN SERPL-MCNC: 18 MG/DL — SIGNIFICANT CHANGE UP (ref 7–23)
CALCIUM SERPL-MCNC: 9.1 MG/DL — SIGNIFICANT CHANGE UP (ref 8.5–10.1)
CHLORIDE SERPL-SCNC: 109 MMOL/L — HIGH (ref 96–108)
CO2 SERPL-SCNC: 28 MMOL/L — SIGNIFICANT CHANGE UP (ref 22–31)
CREAT SERPL-MCNC: 1.45 MG/DL — HIGH (ref 0.5–1.3)
GLUCOSE SERPL-MCNC: 89 MG/DL — SIGNIFICANT CHANGE UP (ref 70–99)
HCT VFR BLD CALC: 53 % — HIGH (ref 39–50)
HGB BLD-MCNC: 17.8 G/DL — HIGH (ref 13–17)
MCHC RBC-ENTMCNC: 29.3 PG — SIGNIFICANT CHANGE UP (ref 27–34)
MCHC RBC-ENTMCNC: 33.6 GM/DL — SIGNIFICANT CHANGE UP (ref 32–36)
MCV RBC AUTO: 87.3 FL — SIGNIFICANT CHANGE UP (ref 80–100)
NRBC # BLD: 0 /100 WBCS — SIGNIFICANT CHANGE UP (ref 0–0)
PLATELET # BLD AUTO: 153 K/UL — SIGNIFICANT CHANGE UP (ref 150–400)
POTASSIUM SERPL-MCNC: 4.6 MMOL/L — SIGNIFICANT CHANGE UP (ref 3.5–5.3)
POTASSIUM SERPL-SCNC: 4.6 MMOL/L — SIGNIFICANT CHANGE UP (ref 3.5–5.3)
PROT SERPL-MCNC: 6.5 GM/DL — SIGNIFICANT CHANGE UP (ref 6–8.3)
RBC # BLD: 6.07 M/UL — HIGH (ref 4.2–5.8)
RBC # FLD: 12.7 % — SIGNIFICANT CHANGE UP (ref 10.3–14.5)
SODIUM SERPL-SCNC: 142 MMOL/L — SIGNIFICANT CHANGE UP (ref 135–145)
WBC # BLD: 6.44 K/UL — SIGNIFICANT CHANGE UP (ref 3.8–10.5)
WBC # FLD AUTO: 6.44 K/UL — SIGNIFICANT CHANGE UP (ref 3.8–10.5)

## 2019-02-03 RX ORDER — METOPROLOL TARTRATE 50 MG
150 TABLET ORAL
Qty: 0 | Refills: 0 | Status: DISCONTINUED | OUTPATIENT
Start: 2019-02-03 | End: 2019-02-09

## 2019-02-03 RX ORDER — DIGOXIN 250 MCG
0.25 TABLET ORAL ONCE
Qty: 0 | Refills: 0 | Status: COMPLETED | OUTPATIENT
Start: 2019-02-03 | End: 2019-02-03

## 2019-02-03 RX ORDER — METOPROLOL TARTRATE 50 MG
50 TABLET ORAL ONCE
Qty: 0 | Refills: 0 | Status: COMPLETED | OUTPATIENT
Start: 2019-02-03 | End: 2019-02-03

## 2019-02-03 RX ADMIN — Medication 5 MILLIGRAM(S): at 01:02

## 2019-02-03 RX ADMIN — Medication 81 MILLIGRAM(S): at 11:10

## 2019-02-03 RX ADMIN — Medication 0.25 MILLIGRAM(S): at 11:08

## 2019-02-03 RX ADMIN — Medication 150 MILLIGRAM(S): at 18:04

## 2019-02-03 RX ADMIN — Medication 10 MILLIGRAM(S): at 11:12

## 2019-02-03 RX ADMIN — TAMSULOSIN HYDROCHLORIDE 0.4 MILLIGRAM(S): 0.4 CAPSULE ORAL at 22:19

## 2019-02-03 RX ADMIN — Medication 25 MICROGRAM(S): at 05:26

## 2019-02-03 RX ADMIN — ATORVASTATIN CALCIUM 40 MILLIGRAM(S): 80 TABLET, FILM COATED ORAL at 22:19

## 2019-02-03 RX ADMIN — Medication 1 TABLET(S): at 18:04

## 2019-02-03 RX ADMIN — Medication 1 TABLET(S): at 05:27

## 2019-02-03 RX ADMIN — Medication 50 MILLIGRAM(S): at 11:08

## 2019-02-03 RX ADMIN — Medication 100 MILLIGRAM(S): at 05:26

## 2019-02-03 RX ADMIN — APIXABAN 5 MILLIGRAM(S): 2.5 TABLET, FILM COATED ORAL at 18:05

## 2019-02-03 RX ADMIN — APIXABAN 5 MILLIGRAM(S): 2.5 TABLET, FILM COATED ORAL at 05:26

## 2019-02-03 NOTE — PROGRESS NOTE ADULT - ASSESSMENT
Assessment and Plan:    1. 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 Afib: not controlled  - changed to metoprolol 150 bid  - digoxin IV given  - anticoagulation with Eliquis     3. Abd cyst:  cw Bactrim    4. New onset systolic CHF EF 18%  - hold on ACEI as patient with KRISTYN and needs more adjustment with meds for rate control   - continue statin, beta blockers  - may need cardiology follow up as outpatient to see EF improves, if not will need ICD   - ? Life vest per cardio --> EP evaluation    outpt cardio, neuro, PMD follow up  possible DC tomm after EP eval and rate control

## 2019-02-03 NOTE — PROGRESS NOTE ADULT - ASSESSMENT
Slurred speech- Appreciate neurology team input.  MRI results noted.  He was noted to have small foci consistent with acute infarcts per MRI report.  Likely embolic in nature  Anticoagulation management as stated below.  Pt is on full dose anticoauglation now. Will hold off on MIHIR for now.    Afib with RVR -poor rate control.  Toprol increased to 150mg po BID and will give iv digoxin now.  He received a dose of digoxin iv last night and toprol was increased to bid.  Continue ELliquis.   He will need MIHIR and cardioversion and will plan for next week Tuesday or Wednesday.  He had a recent CVA.       Newly diagnosed HFrEF- LVEF 18%. Dilated cardiomyopathy with wall motion abnormalities as stated above.  Non ischemic cardiomyopathy- LHC did not reveal any obstructive CAD.    GDMT for HFrEF as stated above.   WIll hold lisinopril to accommodate rate control agents.|  He will need lifevest prior to discharge.   Appears euvolemic on exam.       HTN- meds as stated above.     Hyperlipdiemia- statin     Other medical issues- Management per primary team.  Thank you for allowing me to participate in the care of this patient. Please feel free to contact me with any questions.

## 2019-02-03 NOTE — PROGRESS NOTE ADULT - SUBJECTIVE AND OBJECTIVE BOX
HOSPITALIST ATTENDING PROGRESS NOTE    Chart and meds reviewed.  Patient seen and examined.    HPI: 62 yo male presents to the ED  with complain of episode of AMS today. Patient was at a party today when he suddenly began having slurred speech, facial droop, and diaphoresis. he also had +urinary incontinence. Episode lasted approximately 5 minutes, and then symptoms completely resolved. No LOC. Then he started to have SOB. He still felt SOB during his presentation in ED. As per patient, he has been "not feeling well" for a month, with palpitations and SOB described as "throat tightness." Patient was seen by PMD, was prescribed Augmentin for possible sinus infection, but with no relief of SOB. Patient has been taking Tylenol PM for symptoms and for sleep aid. Patient also has a cyst on his abd, for which he is on abx, doxycycline. Denies tobacco use, illicit drug use. Pt drank 1.5 glasses of wine at party today. Patient has been on a diet to lose weight, has decreased his portion sizes and decreased sugary and fatty foods in his diet, does not eat red meat. Exercises 3-4 days per week.    1/30/19 pt seen and examined, cath postponed due to being on eliquis. received AM dose, DC eliquis, start heparin gtt in evening, stop prior to cath.    1/31/19 awaiting cath today, HR uncontrolled with walking especially.     2/1/19 pt seen and examined, cath yesterday with non ischemic CAD, RHC with no elevated pressures, needs rate control.     2/2/19 feels well, rate not controlled, especially with ambulation, meds changed by cardio, digoxin today IV, changed to toprol    2/3/19 rate not well controlled, EP eval pending      All 10 systems reviewed and found to be negative with the exception of what has been described above.    MEDICATIONS  (STANDING):  apixaban 5 milliGRAM(s) Oral every 12 hours  aspirin  chewable 81 milliGRAM(s) Oral daily  atorvastatin 40 milliGRAM(s) Oral at bedtime  influenza   Vaccine 0.5 milliLiter(s) IntraMuscular once  levothyroxine 25 MICROGram(s) Oral daily  metoprolol succinate  milliGRAM(s) Oral two times a day  oxybutynin 10 milliGRAM(s) Oral daily  tamsulosin 0.4 milliGRAM(s) Oral at bedtime  trimethoprim  160 mG/sulfamethoxazole 800 mG 1 Tablet(s) Oral two times a day    MEDICATIONS  (PRN):  metoprolol tartrate Injectable 5 milliGRAM(s) IV Push every 6 hours PRN HR >130      VITALS:  T(F): 98.1 (02-03-19 @ 11:16), Max: 98.1 (02-03-19 @ 11:16)  HR: 77 (02-03-19 @ 11:16) (77 - 170)  BP: 124/75 (02-03-19 @ 11:16) (103/87 - 133/84)  RR: 18 (02-03-19 @ 11:16) (17 - 20)  SpO2: 99% (02-03-19 @ 11:16) (96% - 100%)  Wt(kg): --    I&O's Summary    02 Feb 2019 07:01  -  03 Feb 2019 07:00  --------------------------------------------------------  IN: 120 mL / OUT: 0 mL / NET: 120 mL        CAPILLARY BLOOD GLUCOSE          PHYSICAL EXAM:    HEENT:  pupils equal and reactive, EOMI, no oropharyngeal lesions, erythema, exudates, oral thrush    NECK:   supple, no carotid bruits, no palpable lymph nodes, no thyromegaly    CV:  +S1, +S2, regular, no murmurs or rubs    RESP:   lungs clear to auscultation bilaterally, no wheezing, rales, rhonchi, good air entry bilaterally    BREAST:  not examined    GI:  abdomen soft, non-tender, non-distended, normal BS, no bruits, no abdominal masses, no palpable masses    RECTAL:  not examined    :  not examined    MSK:   normal muscle tone, no atrophy, no rigidity, no contractions    EXT:   no clubbing, no cyanosis, no edema, no calf pain, swelling or erythema    VASCULAR:  pulses equal and symmetric in the upper and lower extremities    NEURO:  AAOX3, no focal neurological deficits, follows all commands, able to move extremities spontaneously    SKIN:  no ulcers, lesions or rashes    LABS:                            17.8   6.44  )-----------( 153      ( 03 Feb 2019 05:40 )             53.0     02-03    142  |  109<H>  |  18  ----------------------------<  89  4.6   |  28  |  1.45<H>    Ca    9.1      03 Feb 2019 05:40    TPro  6.5  /  Alb  3.4  /  TBili  1.0  /  DBili  x   /  AST  41<H>  /  ALT  90<H>  /  AlkPhos  85  02-03        LIVER FUNCTIONS - ( 03 Feb 2019 05:40 )  Alb: 3.4 g/dL / Pro: 6.5 gm/dL / ALK PHOS: 85 U/L / ALT: 90 U/L / AST: 41 U/L / GGT: x                                             CULTURES:

## 2019-02-03 NOTE — PROGRESS NOTE ADULT - SUBJECTIVE AND OBJECTIVE BOX
Patient is a 63y old  Male who presents with a chief complaint of complain of slurry speech and altered mental status.       HPI:  64 yo male presents to the ED  with complain of episode of AMS today. Patient was at a party today when he suddenly began having slurred speech, facial droop, and diaphoresis. he also had +urinary incontinence. Episode lasted approximately 5 minutes, and then symptoms completely resolved. No LOC.  During the hosptial course he was noted to have afib wtih RVR and was started on cardizem drip.  He was also started on heparin drip for anticoagulation    - pt seen and examined by me today. Pt denies any new symptoms today.   - pt seen and examined by me today. c/o anxiety    - pt seen and examined by me today. Pt denies any CP or SOB.   - pt seen and examined by me today. Pt denies any CP or SOB.    - pt seen and examined by me today. denies any symptoms.     2/3- pt seen and examined by me today. Denies any symptoms.         Family Hx:  Mother: Alive, 91 yr, had colon cancer, DM, Aortic valve replacement  Father:  from Stomach cancer    PMHx:  BPH  Hypothyroidism     PSHx:  No recent surgeries. (2019 00:15)      PAST MEDICAL & SURGICAL HISTORY:  HLD (hyperlipidemia)  Hypercholesterolemia: controlled by diet and exercise  HTN - Hypertension: borderline controlled by diet/  exercise  Left Ear Excision of Cholesteotoma:   Left Inguinal Hernia Repair: @ 8 years old      MEDICATIONS  (STANDING):  diazepam    Tablet 2 milliGRAM(s) Oral once  diltiazem    Tablet 30 milliGRAM(s) Oral every 6 hours  diltiazem Infusion 10 mG/Hr (10 mL/Hr) IV Continuous <Continuous>  doxycycline hyclate Capsule 100 milliGRAM(s) Oral every 12 hours  heparin  Infusion.  Unit(s)/Hr (20 mL/Hr) IV Continuous <Continuous>  influenza   Vaccine 0.5 milliLiter(s) IntraMuscular once  levothyroxine 25 MICROGram(s) Oral daily  metoprolol tartrate 25 milliGRAM(s) Oral two times a day  oxybutynin 10 milliGRAM(s) Oral daily  tamsulosin 0.4 milliGRAM(s) Oral at bedtime    MEDICATIONS  (PRN):  heparin  Injectable 9000 Unit(s) IV Push every 6 hours PRN For aPTT less than 40  heparin  Injectable 4500 Unit(s) IV Push every 6 hours PRN For aPTT between 40 - 57      FAMILY HISTORY:      SOCIAL HISTORY:   ex smoker    REVIEW OF SYSTEMS:  CONSTITUTIONAL:    No fatigue, malaise, lethargy.  No fever or chills.  HEENT:  Eyes:  No visual changes.     ENT:  No epistaxis.  No sinus pain.    RESPIRATORY:  No cough.  No wheeze.  No hemoptysis.  No shortness of breath.  CARDIOVASCULAR:  No chest pains.  No palpitations. No shortness of breath, No orthopnea or PND.  GASTROINTESTINAL:  No abdominal pain.  No nausea or vomiting.    GENITOURINARY:    No hematuria.    MUSCULOSKELETAL:  No musculoskeletal pain.  No joint swelling.  No arthritis.  NEUROLOGICAL:  No tingling or numbness or weakness.  PSYCHIATRIC:  No confusion  SKIN:  No rashes.    ENDOCRINE:  No unexplained weight loss.  No polydipsia.   HEMATOLOGIC:  No anemia.  No prolonged or excessive bleeding.   ALLERGIC AND IMMUNOLOGIC:  No pruritus.          Vital Signs Last 24 Hrs  T(C): 36.7 (2019 04:36), Max: 36.8 (2019 17:14)  T(F): 98 (2019 04:36), Max: 98.2 (2019 17:14)  HR: 72 (2019 04:36) (72 - 117)  BP: 108/70 (2019 04:36) (106/59 - 121/73)  BP(mean): --  RR: 18 (2019 17:14) (18 - 18)  SpO2: 98% (2019 04:36) (96% - 99%)    PHYSICAL EXAM-    Constitutional: no acute distress    Head: Head is normocephalic and atraumatic.      Neck:  There are strong carotid pulses bilaterally. No JVD.     Cardiovascular: irregular rate and tachycardic.      Respiratory: Breathsounds are normal. No rales. No wheezing.    Abdomen: Soft, nontender, nondistended with positive bowel sounds.      Extremity: No tenderness. No  pitting edema     Neurologic: The patient is alert and oriented.      Skin: No rash, no obvious lesions noted.      Psychiatric: The patient appears to be emotionally stable.      INTERPRETATION OF TELEMETRY: afib 100/min    ECG:     I&O's Detail    2019 07:01  -  2019 07:00  --------------------------------------------------------  IN:    diltiazem Infusion: 248 mL    heparin  Infusion.: 387.9 mL  Total IN: 635.9 mL    OUT:  Total OUT: 0 mL    Total NET: 635.9 mL          LABS:                        15.5   6.55  )-----------( 155      ( 2019 06:55 )             46.6         145  |  117<H>  |  13  ----------------------------<  97  4.1   |  21<L>  |  1.25    Ca    8.8      2019 09:22  Mg     1.9         TPro  6.4  /  Alb  3.4  /  TBili  1.8<H>  /  DBili  x   /  AST  43<H>  /  ALT  126<H>  /  AlkPhos  68      CARDIAC MARKERS ( 2019 09:22 )  0.040 ng/mL / x     / x     / x     / x      CARDIAC MARKERS ( 2019 17:53 )  0.029 ng/mL / x     / x     / x     / x          PT/INR - ( 2019 17:53 )   PT: 16.5 sec;   INR: 1.47 ratio         PTT - ( 2019 06:56 )  PTT:84.3 sec  Urinalysis Basic - ( 2019 19:30 )    Color: Yellow / Appearance: Clear / S.025 / pH: x  Gluc: x / Ketone: Negative  / Bili: Negative / Urobili: Negative mg/dL   Blood: x / Protein: 15 mg/dL / Nitrite: Negative   Leuk Esterase: Trace / RBC: Negative /HPF / WBC 3-5   Sq Epi: x / Non Sq Epi: Occasional / Bacteria: Negative      I&O's Summary    2019 07:01  -  2019 07:00  --------------------------------------------------------  IN: 635.9 mL / OUT: 0 mL / NET: 635.9 mL      BNP  RADIOLOGY & ADDITIONAL STUDIES:  < from: CT Head No Cont (19 @ 18:17) >  IMPRESSION:     No acute intracranial bleeding, mass effect, or shift. Mild chronic   microvascular ischemic changes.                 NOEMI PEREIRA   This document has been electronically signed. 2019  6:26PM    < end of copied text >  < from: Transthoracic Echocardiogram (19 @ 12:02) >   Summary     The left ventricle cavity is mildly dilated. Left ventricle systolic   function appears severely impaired globally along with segmental wall   motion abnormalities noted. Severe hypokinesis of the inferior,   inferoseptum, anteroseptum and anterior walls. The apical cap is   hypokinetic and free of thrombus. Estimated ejection fraction is 18% via   bi-plane method.   The left atrium is moderately dilated.   The right atrium appears borderline dilated.   The right ventricle is borderline dilated with mildly impaired function.   The aortic valve is well visualized, appears mildly sclerotic. Aortic   valve leaflet excursion is preserved.   Trace aortic regurgitation is present.   The mitral valve leaflets are well seen thin and pliable; preserved   leaflet excursion noted. Trace to mild mitral valve regurgitation noted.   Redundant chordae noted.   EA reversal of the mitral inflow consistent with reduced compliance of   the   left ventricle (When appreciable.)   The tricuspid valve leaflets are well seen and appear thin and pliable   with preserved leaflets excursion. Trace tricuspid regurgitation noted.   The pulmonic valve leaflets appear thin and pliable. Trace to mild   pulmonic regurgitation noted.   No evidence of pericardial effusion.   No evidence of pleural effusion.     Signature     ----------------------------------------------------------------   Electronically signed by Mark Dueñas MD(Interpreting   physician) on 2019 05:48 PM   ----------------------------------------------------------------    < end of copied text >  < from: MR Head No Cont (19 @ 10:16) >  IMPRESSION:    small subcentimeter foci of restricted diffusion in the   RIGHT insular cortex and RIGHT parietal cortex stent with acute   infarctions. Bilateral mastoiditis is noted.                ELSY HATCH M.D., ATTENDING RADIOLOGIST  This document has been electronically signed. 2019 10:42AM    < end of copied text >

## 2019-02-04 LAB
ALBUMIN SERPL ELPH-MCNC: 3.7 G/DL — SIGNIFICANT CHANGE UP (ref 3.3–5)
ALP SERPL-CCNC: 93 U/L — SIGNIFICANT CHANGE UP (ref 40–120)
ALT FLD-CCNC: 98 U/L — HIGH (ref 12–78)
ANION GAP SERPL CALC-SCNC: 7 MMOL/L — SIGNIFICANT CHANGE UP (ref 5–17)
AST SERPL-CCNC: 46 U/L — HIGH (ref 15–37)
BILIRUB SERPL-MCNC: 1 MG/DL — SIGNIFICANT CHANGE UP (ref 0.2–1.2)
BUN SERPL-MCNC: 20 MG/DL — SIGNIFICANT CHANGE UP (ref 7–23)
CALCIUM SERPL-MCNC: 9.5 MG/DL — SIGNIFICANT CHANGE UP (ref 8.5–10.1)
CHLORIDE SERPL-SCNC: 108 MMOL/L — SIGNIFICANT CHANGE UP (ref 96–108)
CO2 SERPL-SCNC: 26 MMOL/L — SIGNIFICANT CHANGE UP (ref 22–31)
CREAT SERPL-MCNC: 1.43 MG/DL — HIGH (ref 0.5–1.3)
GLUCOSE SERPL-MCNC: 89 MG/DL — SIGNIFICANT CHANGE UP (ref 70–99)
HCT VFR BLD CALC: 56.9 % — HIGH (ref 39–50)
HGB BLD-MCNC: 18.6 G/DL — HIGH (ref 13–17)
MCHC RBC-ENTMCNC: 28.4 PG — SIGNIFICANT CHANGE UP (ref 27–34)
MCHC RBC-ENTMCNC: 32.7 GM/DL — SIGNIFICANT CHANGE UP (ref 32–36)
MCV RBC AUTO: 87 FL — SIGNIFICANT CHANGE UP (ref 80–100)
NRBC # BLD: 0 /100 WBCS — SIGNIFICANT CHANGE UP (ref 0–0)
PLATELET # BLD AUTO: 170 K/UL — SIGNIFICANT CHANGE UP (ref 150–400)
POTASSIUM SERPL-MCNC: 5.1 MMOL/L — SIGNIFICANT CHANGE UP (ref 3.5–5.3)
POTASSIUM SERPL-SCNC: 5.1 MMOL/L — SIGNIFICANT CHANGE UP (ref 3.5–5.3)
PROT SERPL-MCNC: 7.2 GM/DL — SIGNIFICANT CHANGE UP (ref 6–8.3)
RBC # BLD: 6.54 M/UL — HIGH (ref 4.2–5.8)
RBC # FLD: 13.1 % — SIGNIFICANT CHANGE UP (ref 10.3–14.5)
SODIUM SERPL-SCNC: 141 MMOL/L — SIGNIFICANT CHANGE UP (ref 135–145)
WBC # BLD: 5.55 K/UL — SIGNIFICANT CHANGE UP (ref 3.8–10.5)
WBC # FLD AUTO: 5.55 K/UL — SIGNIFICANT CHANGE UP (ref 3.8–10.5)

## 2019-02-04 RX ORDER — DIGOXIN 250 MCG
0.12 TABLET ORAL DAILY
Qty: 0 | Refills: 0 | Status: DISCONTINUED | OUTPATIENT
Start: 2019-02-04 | End: 2019-02-08

## 2019-02-04 RX ADMIN — TAMSULOSIN HYDROCHLORIDE 0.4 MILLIGRAM(S): 0.4 CAPSULE ORAL at 21:59

## 2019-02-04 RX ADMIN — APIXABAN 5 MILLIGRAM(S): 2.5 TABLET, FILM COATED ORAL at 06:31

## 2019-02-04 RX ADMIN — ATORVASTATIN CALCIUM 40 MILLIGRAM(S): 80 TABLET, FILM COATED ORAL at 21:59

## 2019-02-04 RX ADMIN — Medication 1 TABLET(S): at 17:47

## 2019-02-04 RX ADMIN — Medication 1 TABLET(S): at 06:30

## 2019-02-04 RX ADMIN — Medication 150 MILLIGRAM(S): at 06:30

## 2019-02-04 RX ADMIN — APIXABAN 5 MILLIGRAM(S): 2.5 TABLET, FILM COATED ORAL at 17:46

## 2019-02-04 RX ADMIN — Medication 0.12 MILLIGRAM(S): at 13:52

## 2019-02-04 RX ADMIN — Medication 25 MICROGRAM(S): at 06:31

## 2019-02-04 RX ADMIN — Medication 81 MILLIGRAM(S): at 13:53

## 2019-02-04 RX ADMIN — Medication 10 MILLIGRAM(S): at 13:56

## 2019-02-04 RX ADMIN — Medication 150 MILLIGRAM(S): at 17:47

## 2019-02-04 NOTE — PROGRESS NOTE ADULT - SUBJECTIVE AND OBJECTIVE BOX
HOSPITALIST ATTENDING PROGRESS NOTE    Chart and meds reviewed.  Patient seen and examined.    HPI: 64 yo male presents to the ED  with complain of episode of AMS today. Patient was at a party today when he suddenly began having slurred speech, facial droop, and diaphoresis. he also had +urinary incontinence. Episode lasted approximately 5 minutes, and then symptoms completely resolved. No LOC. Then he started to have SOB. He still felt SOB during his presentation in ED. As per patient, he has been "not feeling well" for a month, with palpitations and SOB described as "throat tightness." Patient was seen by PMD, was prescribed Augmentin for possible sinus infection, but with no relief of SOB. Patient has been taking Tylenol PM for symptoms and for sleep aid. Patient also has a cyst on his abd, for which he is on abx, doxycycline. Denies tobacco use, illicit drug use. Pt drank 1.5 glasses of wine at party today. Patient has been on a diet to lose weight, has decreased his portion sizes and decreased sugary and fatty foods in his diet, does not eat red meat. Exercises 3-4 days per week.    1/30/19 pt seen and examined, cath postponed due to being on eliquis. received AM dose, DC eliquis, start heparin gtt in evening, stop prior to cath.    1/31/19 awaiting cath today, HR uncontrolled with walking especially.     2/1/19 pt seen and examined, cath yesterday with non ischemic CAD, RHC with no elevated pressures, needs rate control.     2/2/19 feels well, rate not controlled, especially with ambulation, meds changed by cardio, digoxin today IV, changed to toprol    2/3/19 rate not well controlled, EP eval pending    2/4/19 pt seen and examined, rate still high with ambulation. d/w     All 10 systems reviewed and found to be negative with the exception of what has been described above.    MEDICATIONS  (STANDING):  apixaban 5 milliGRAM(s) Oral every 12 hours  aspirin  chewable 81 milliGRAM(s) Oral daily  atorvastatin 40 milliGRAM(s) Oral at bedtime  digoxin     Tablet 0.125 milliGRAM(s) Oral daily  influenza   Vaccine 0.5 milliLiter(s) IntraMuscular once  levothyroxine 25 MICROGram(s) Oral daily  metoprolol succinate  milliGRAM(s) Oral two times a day  oxybutynin 10 milliGRAM(s) Oral daily  tamsulosin 0.4 milliGRAM(s) Oral at bedtime  trimethoprim  160 mG/sulfamethoxazole 800 mG 1 Tablet(s) Oral two times a day    MEDICATIONS  (PRN):  metoprolol tartrate Injectable 5 milliGRAM(s) IV Push every 6 hours PRN HR >130      VITALS:  T(F): 97.7 (02-04-19 @ 10:35), Max: 97.9 (02-04-19 @ 04:38)  HR: 58 (02-04-19 @ 10:35) (58 - 94)  BP: 123/89 (02-04-19 @ 10:35) (110/72 - 140/78)  RR: 18 (02-04-19 @ 10:35) (18 - 18)  SpO2: 99% (02-04-19 @ 10:35) (99% - 99%)  Wt(kg): --    I&O's Summary      CAPILLARY BLOOD GLUCOSE          PHYSICAL EXAM:    HEENT:  pupils equal and reactive, EOMI, no oropharyngeal lesions, erythema, exudates, oral thrush    NECK:   supple, no carotid bruits, no palpable lymph nodes, no thyromegaly    CV:  +S1, +S2, regular, no murmurs or rubs    RESP:   lungs clear to auscultation bilaterally, no wheezing, rales, rhonchi, good air entry bilaterally    BREAST:  not examined    GI:  abdomen soft, non-tender, non-distended, normal BS, no bruits, no abdominal masses, no palpable masses    RECTAL:  not examined    :  not examined    MSK:   normal muscle tone, no atrophy, no rigidity, no contractions    EXT:   no clubbing, no cyanosis, no edema, no calf pain, swelling or erythema    VASCULAR:  pulses equal and symmetric in the upper and lower extremities    NEURO:  AAOX3, no focal neurological deficits, follows all commands, able to move extremities spontaneously    SKIN:  no ulcers, lesions or rashes    LABS:                            18.6   5.55  )-----------( 170      ( 04 Feb 2019 06:44 )             56.9     02-04    141  |  108  |  20  ----------------------------<  89  5.1   |  26  |  1.43<H>    Ca    9.5      04 Feb 2019 06:44    TPro  7.2  /  Alb  3.7  /  TBili  1.0  /  DBili  x   /  AST  46<H>  /  ALT  98<H>  /  AlkPhos  93  02-04        LIVER FUNCTIONS - ( 04 Feb 2019 06:44 )  Alb: 3.7 g/dL / Pro: 7.2 gm/dL / ALK PHOS: 93 U/L / ALT: 98 U/L / AST: 46 U/L / GGT: x                                             CULTURES:

## 2019-02-04 NOTE — PROGRESS NOTE ADULT - ASSESSMENT
Assessment and Plan:    1. 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 Afib: not controlled  - changed to metoprolol 150 bid  - digoxin IV given  - anticoagulation with Eliquis     3. Abd cyst:  cw Bactrim    4. New onset systolic CHF EF 18%  - hold on ACEI as patient with KRISTYN and needs more adjustment with meds for rate control   - continue statin, beta blockers  - may need cardiology follow up as outpatient to see EF improves, if not will need ICD   - ? Life vest per cardio --> EP evaluation    outpt cardio, neuro, PMD follow up  EP eval pending  Will need better rate control and may still until cardioversion if rate unable to be controlled

## 2019-02-04 NOTE — PROGRESS NOTE ADULT - SUBJECTIVE AND OBJECTIVE BOX
Patient is a 63y old  Male who presents with a chief complaint of complain of slurry speech and altered mental status.       HPI:  62 yo male presents to the ED  with complain of episode of AMS today. Patient was at a party today when he suddenly began having slurred speech, facial droop, and diaphoresis. he also had +urinary incontinence. Episode lasted approximately 5 minutes, and then symptoms completely resolved. No LOC.  During the hosptial course he was noted to have afib wtih RVR and was started on cardizem drip.  He was also started on heparin drip for anticoagulation    - pt seen and examined by me today. Pt denies any new symptoms today.   - pt seen and examined by me today. c/o anxiety    - pt seen and examined by me today. Pt denies any CP or SOB.   - pt seen and examined by me today. Pt denies any CP or SOB.    /- pt seen and examined by me today. denies any symptoms.     2/3- pt seen and examined by me today. Denies any symptoms.     - pt seen and examined by me today. He still has afib with tachycardia.     Family Hx:  Mother: Alive, 91 yr, had colon cancer, DM, Aortic valve replacement  Father:  from Stomach cancer    PMHx:  BPH  Hypothyroidism     PSHx:  No recent surgeries. (2019 00:15)      PAST MEDICAL & SURGICAL HISTORY:  HLD (hyperlipidemia)  Hypercholesterolemia: controlled by diet and exercise  HTN - Hypertension: borderline controlled by diet/  exercise  Left Ear Excision of Cholesteotoma:   Left Inguinal Hernia Repair: @ 8 years old      MEDICATIONS  (STANDING):  diazepam    Tablet 2 milliGRAM(s) Oral once  diltiazem    Tablet 30 milliGRAM(s) Oral every 6 hours  diltiazem Infusion 10 mG/Hr (10 mL/Hr) IV Continuous <Continuous>  doxycycline hyclate Capsule 100 milliGRAM(s) Oral every 12 hours  heparin  Infusion.  Unit(s)/Hr (20 mL/Hr) IV Continuous <Continuous>  influenza   Vaccine 0.5 milliLiter(s) IntraMuscular once  levothyroxine 25 MICROGram(s) Oral daily  metoprolol tartrate 25 milliGRAM(s) Oral two times a day  oxybutynin 10 milliGRAM(s) Oral daily  tamsulosin 0.4 milliGRAM(s) Oral at bedtime    MEDICATIONS  (PRN):  heparin  Injectable 9000 Unit(s) IV Push every 6 hours PRN For aPTT less than 40  heparin  Injectable 4500 Unit(s) IV Push every 6 hours PRN For aPTT between 40 - 57      FAMILY HISTORY:      SOCIAL HISTORY:   ex smoker    REVIEW OF SYSTEMS:  CONSTITUTIONAL:    No fatigue, malaise, lethargy.  No fever or chills.  HEENT:  Eyes:  No visual changes.     ENT:  No epistaxis.  No sinus pain.    RESPIRATORY:  No cough.  No wheeze.  No hemoptysis.  No shortness of breath.  CARDIOVASCULAR:  No chest pains.  No palpitations. No shortness of breath, No orthopnea or PND.  GASTROINTESTINAL:  No abdominal pain.  No nausea or vomiting.    GENITOURINARY:    No hematuria.    MUSCULOSKELETAL:  No musculoskeletal pain.  No joint swelling.  No arthritis.  NEUROLOGICAL:  No tingling or numbness or weakness.  PSYCHIATRIC:  No confusion  SKIN:  No rashes.    ENDOCRINE:  No unexplained weight loss.  No polydipsia.   HEMATOLOGIC:  No anemia.  No prolonged or excessive bleeding.   ALLERGIC AND IMMUNOLOGIC:  No pruritus.          Vital Signs Last 24 Hrs  T(C): 36.7 (2019 04:36), Max: 36.8 (2019 17:14)  T(F): 98 (2019 04:36), Max: 98.2 (2019 17:14)  HR: 72 (2019 04:36) (72 - 117)  BP: 108/70 (2019 04:36) (106/59 - 121/73)  BP(mean): --  RR: 18 (2019 17:14) (18 - 18)  SpO2: 98% (2019 04:36) (96% - 99%)    PHYSICAL EXAM-    Constitutional: no acute distress    Head: Head is normocephalic and atraumatic.      Neck:  There are strong carotid pulses bilaterally. No JVD.     Cardiovascular: irregular rate and tachycardic.      Respiratory: Breathsounds are normal. No rales. No wheezing.    Abdomen: Soft, nontender, nondistended with positive bowel sounds.      Extremity: No tenderness. No  pitting edema     Neurologic: The patient is alert and oriented.      Skin: No rash, no obvious lesions noted.      Psychiatric: The patient appears to be emotionally stable.      INTERPRETATION OF TELEMETRY: afib 100/min    ECG:     I&O's Detail    2019 07:01  -  2019 07:00  --------------------------------------------------------  IN:    diltiazem Infusion: 248 mL    heparin  Infusion.: 387.9 mL  Total IN: 635.9 mL    OUT:  Total OUT: 0 mL    Total NET: 635.9 mL          LABS:                        15.5   6.55  )-----------( 155      ( 2019 06:55 )             46.6         145  |  117<H>  |  13  ----------------------------<  97  4.1   |  21<L>  |  1.25    Ca    8.8      2019 09:22  Mg     1.9         TPro  6.4  /  Alb  3.4  /  TBili  1.8<H>  /  DBili  x   /  AST  43<H>  /  ALT  126<H>  /  AlkPhos  68      CARDIAC MARKERS ( 2019 09:22 )  0.040 ng/mL / x     / x     / x     / x      CARDIAC MARKERS ( 2019 17:53 )  0.029 ng/mL / x     / x     / x     / x          PT/INR - ( 2019 17:53 )   PT: 16.5 sec;   INR: 1.47 ratio         PTT - ( 2019 06:56 )  PTT:84.3 sec  Urinalysis Basic - ( 2019 19:30 )    Color: Yellow / Appearance: Clear / S.025 / pH: x  Gluc: x / Ketone: Negative  / Bili: Negative / Urobili: Negative mg/dL   Blood: x / Protein: 15 mg/dL / Nitrite: Negative   Leuk Esterase: Trace / RBC: Negative /HPF / WBC 3-5   Sq Epi: x / Non Sq Epi: Occasional / Bacteria: Negative      I&O's Summary    2019 07:01  -  2019 07:00  --------------------------------------------------------  IN: 635.9 mL / OUT: 0 mL / NET: 635.9 mL      BNP  RADIOLOGY & ADDITIONAL STUDIES:  < from: CT Head No Cont (19 @ 18:17) >  IMPRESSION:     No acute intracranial bleeding, mass effect, or shift. Mild chronic   microvascular ischemic changes.                 NOEMI PEREIRA   This document has been electronically signed. 2019  6:26PM    < end of copied text >  < from: Transthoracic Echocardiogram (19 @ 12:02) >   Summary     The left ventricle cavity is mildly dilated. Left ventricle systolic   function appears severely impaired globally along with segmental wall   motion abnormalities noted. Severe hypokinesis of the inferior,   inferoseptum, anteroseptum and anterior walls. The apical cap is   hypokinetic and free of thrombus. Estimated ejection fraction is 18% via   bi-plane method.   The left atrium is moderately dilated.   The right atrium appears borderline dilated.   The right ventricle is borderline dilated with mildly impaired function.   The aortic valve is well visualized, appears mildly sclerotic. Aortic   valve leaflet excursion is preserved.   Trace aortic regurgitation is present.   The mitral valve leaflets are well seen thin and pliable; preserved   leaflet excursion noted. Trace to mild mitral valve regurgitation noted.   Redundant chordae noted.   EA reversal of the mitral inflow consistent with reduced compliance of   the   left ventricle (When appreciable.)   The tricuspid valve leaflets are well seen and appear thin and pliable   with preserved leaflets excursion. Trace tricuspid regurgitation noted.   The pulmonic valve leaflets appear thin and pliable. Trace to mild   pulmonic regurgitation noted.   No evidence of pericardial effusion.   No evidence of pleural effusion.     Signature     ----------------------------------------------------------------   Electronically signed by Mark Dueñas MD(Interpreting   physician) on 2019 05:48 PM   ----------------------------------------------------------------    < end of copied text >  < from: MR Head No Cont (19 @ 10:16) >  IMPRESSION:    small subcentimeter foci of restricted diffusion in the   RIGHT insular cortex and RIGHT parietal cortex stent with acute   infarctions. Bilateral mastoiditis is noted.                ELSY HATCH M.D., ATTENDING RADIOLOGIST  This document has been electronically signed. 2019 10:42AM    < end of copied text >

## 2019-02-04 NOTE — PROGRESS NOTE ADULT - ASSESSMENT
Slurred speech- Appreciate neurology team input.  MRI results noted.  He was noted to have small foci consistent with acute infarcts per MRI report.  Likely embolic in nature  Anticoagulation management as stated below.  Pt is on full dose anticoauglation now. Will hold off on MIHIR for now.    Afib with RVR -poor rate control.  Continue toprol XL and digoxin.    Continue ELliquis.   He will need MIHIR and cardioversion and will plan for this week likely Thursday.  He had a recent CVA.       Newly diagnosed HFrEF- LVEF 18%. Dilated cardiomyopathy with wall motion abnormalities as stated above.  Non ischemic cardiomyopathy- LHC did not reveal any obstructive CAD.    GDMT for HFrEF as stated above.   WIll hold lisinopril to accommodate rate control agents.|  He will need lifevest prior to discharge.   Appears euvolemic on exam.       HTN- meds as stated above.     Hyperlipdiemia- statin     Other medical issues- Management per primary team.  Thank you for allowing me to participate in the care of this patient. Please feel free to contact me with any questions.

## 2019-02-04 NOTE — CONSULT NOTE ADULT - SUBJECTIVE AND OBJECTIVE BOX
[facility]/ [facility] consult   HPI:  62 yo male presents to the ED  with complain of episode of AMS today. Patient was at a party today when he suddenly began having slurred speech, facial droop, and diaphoresis. he also had +urinary incontinence. Episode lasted approximately 5 minutes, and then symptoms completely resolved. No LOC. Then he started to have SOB. He still felt SOB during his presentation in ED. As per patient, he has been "not feeling well" for a month, with palpitations and SOB described as "throat tightness." Patient was seen by PMD, was prescribed Augmentin for possible sinus infection, but with no relief of SOB. Patient has been taking Tylenol PM for symptoms and for sleep aid. Patient also has a cyst on his abd, for which he is on abx, doxycycline. Denies tobacco use, illicit drug use. Pt drank 1.5 glasses of wine at party today. Patient has been on a diet to lose weight, has decreased his portion sizes and decreased sugary and fatty foods in his diet, does not eat red meat. Exercises 3-4 days per week.  During my evaluation at the bed side patient was totally any symptom free. No slurry speech no sob, no palpitation.     Hematology consulted for erythrocytosis     In brief, this is a 63-year-old gentleman with recently diagnosed CVA further complicated by systolic heart failure now found to have erythrocytosis.  Overall the patient denies any prior history of having elevated red blood cell count.  He denies any flushing sensation lightheadedness dizziness palpitations night sweats fevers or chills.  Today the patient notes that over the last 4-5 days he had developed some lower extremity swelling which had been alleviated with concurrent diuretics.  After review of the patient's chronological history, the patient's CBC while here hemoglobin on admission was noted at 17.0 crit was 51.1 while here hemoglobin ranged from 15.5 to the highest today at 18.6 hematocrit today is 56.9.     Family Hx:  Mother: Alive, 91 yr, had colon cancer, DM, Aortic valve replacement  Father:  from Stomach cancer    PMHx:  BPH  Hypothyroidism     PSHx:  No recent surgeries. (2019 00:15)      Allergies    chocolate (Nausea)  chocolate (Rash)  No Known Drug Allergies    Intolerances        MEDICATIONS  (STANDING):  apixaban 5 milliGRAM(s) Oral every 12 hours  aspirin  chewable 81 milliGRAM(s) Oral daily  atorvastatin 40 milliGRAM(s) Oral at bedtime  digoxin     Tablet 0.125 milliGRAM(s) Oral daily  influenza   Vaccine 0.5 milliLiter(s) IntraMuscular once  levothyroxine 25 MICROGram(s) Oral daily  metoprolol succinate  milliGRAM(s) Oral two times a day  oxybutynin 10 milliGRAM(s) Oral daily  tamsulosin 0.4 milliGRAM(s) Oral at bedtime  trimethoprim  160 mG/sulfamethoxazole 800 mG 1 Tablet(s) Oral two times a day    MEDICATIONS  (PRN):  metoprolol tartrate Injectable 5 milliGRAM(s) IV Push every 6 hours PRN HR >130      PAST MEDICAL & SURGICAL HISTORY:  HLD (hyperlipidemia)  Hypercholesterolemia: controlled by diet and exercise  HTN - Hypertension: borderline controlled by diet/  exercise  Left Ear Excision of Cholesteotoma:   Left Inguinal Hernia Repair: @ 8 years old      FAMILY HISTORY:      SOCIAL HISTORY: No EtOH, no tobacco      Todays's Evaluation:    GENERAL: NAD, well-developed  HEAD:  Atraumatic, Normocephalic  EYES: EOMI, PERRLA, conjunctiva and sclera clear  NECK: Supple, No JVD  CHEST/LUNG: Clear to auscultation bilaterally; No wheeze  HEART: Regular rate and rhythm; No murmurs, rubs, or gallops  ABDOMEN: Soft, Nontender, Nondistended; Bowel sounds present  EXTREMITIES:  2+ Peripheral Pulses, No clubbing, cyanosis, or edema  NEUROLOGY: non-focal  SKIN: No rashes or lesions    Laboratories:                           18.6   5.55  )-----------( 170      ( 2019 06:44 )             56.9       -04    141  |  108  |  20  ----------------------------<  89  5.1   |  26  |  1.43<H>    Ca    9.5      2019 06:44    TPro  7.2  /  Alb  3.7  /  TBili  1.0  /  DBili  x   /  AST  46<H>  /  ALT  98<H>  /  AlkPhos  93  -04          Summary:    Plan:

## 2019-02-04 NOTE — CONSULT NOTE ADULT - ASSESSMENT
Overall this is a 63-year-old gentleman with recently diagnosed atrial fibrillation as well as CVA further complicated by acute systolic heart failure now with erythrocytosis  At this time I have explained to the patient that given the patient's most likely clinical course volume depletion with concurrent diuretics is likely potentiated the patient's erythrocytosis.  I explained to him that there are conditions associated with elevated red blood cell count most commonly secondary polycythemia which include sources of volume contraction as well as chronic hypoxia.  The patient's clinical history does not confer with the latter.  With regards to primary etiologies of polycythemia including genetic abnormalities such as Manpreet 2 mutation BCR- ABL and other additional mutations associated with myeloproliferative neoplasms, I will have the patient's workup completed as an outpatient prior to proceeding.  At this point there is no hematologic contraindication to the patient's planned possible cardioversion.   I explained to the patient that with such erythrocytosis can consider therapeutic phlebotomies if his counts should worsen yet my suscpicioun is that current RBC is potentiated by volume contraction.   will check EPO

## 2019-02-05 DIAGNOSIS — I50.21 ACUTE SYSTOLIC (CONGESTIVE) HEART FAILURE: ICD-10-CM

## 2019-02-05 DIAGNOSIS — I48.91 UNSPECIFIED ATRIAL FIBRILLATION: ICD-10-CM

## 2019-02-05 LAB
DIGOXIN SERPL-MCNC: 0.63 NG/ML — LOW (ref 0.8–2)
HCT VFR BLD CALC: 56.7 % — HIGH (ref 39–50)
HGB BLD-MCNC: 19.1 G/DL — CRITICAL HIGH (ref 13–17)
MCHC RBC-ENTMCNC: 29.2 PG — SIGNIFICANT CHANGE UP (ref 27–34)
MCHC RBC-ENTMCNC: 33.7 GM/DL — SIGNIFICANT CHANGE UP (ref 32–36)
MCV RBC AUTO: 86.8 FL — SIGNIFICANT CHANGE UP (ref 80–100)
NRBC # BLD: 0 /100 WBCS — SIGNIFICANT CHANGE UP (ref 0–0)
PLATELET # BLD AUTO: 180 K/UL — SIGNIFICANT CHANGE UP (ref 150–400)
RBC # BLD: 6.53 M/UL — HIGH (ref 4.2–5.8)
RBC # FLD: 13.1 % — SIGNIFICANT CHANGE UP (ref 10.3–14.5)
WBC # BLD: 7.25 K/UL — SIGNIFICANT CHANGE UP (ref 3.8–10.5)
WBC # FLD AUTO: 7.25 K/UL — SIGNIFICANT CHANGE UP (ref 3.8–10.5)

## 2019-02-05 RX ADMIN — Medication 1 TABLET(S): at 19:20

## 2019-02-05 RX ADMIN — TAMSULOSIN HYDROCHLORIDE 0.4 MILLIGRAM(S): 0.4 CAPSULE ORAL at 21:32

## 2019-02-05 RX ADMIN — APIXABAN 5 MILLIGRAM(S): 2.5 TABLET, FILM COATED ORAL at 05:22

## 2019-02-05 RX ADMIN — Medication 0.12 MILLIGRAM(S): at 05:22

## 2019-02-05 RX ADMIN — APIXABAN 5 MILLIGRAM(S): 2.5 TABLET, FILM COATED ORAL at 19:19

## 2019-02-05 RX ADMIN — Medication 150 MILLIGRAM(S): at 05:26

## 2019-02-05 RX ADMIN — Medication 25 MICROGRAM(S): at 05:22

## 2019-02-05 RX ADMIN — Medication 10 MILLIGRAM(S): at 11:24

## 2019-02-05 RX ADMIN — Medication 81 MILLIGRAM(S): at 11:24

## 2019-02-05 RX ADMIN — Medication 1 TABLET(S): at 05:22

## 2019-02-05 RX ADMIN — ATORVASTATIN CALCIUM 40 MILLIGRAM(S): 80 TABLET, FILM COATED ORAL at 21:32

## 2019-02-05 NOTE — CONSULT NOTE ADULT - SUBJECTIVE AND OBJECTIVE BOX
62 yo M with PMHx HTN, HLD, hypothyroidism, BPH presented with AMS. Pt was at a party and suddenly began to have slurred speech, facial droop, and diaphoresis, lasted about 5 minutes. Pt found to have acute R insular cortex and parietal cortex CVA, with newly discovered A Fib RVR. Pt placed on AC, transitioned to Eliquis, initially placed on cardizem gtt, but found to have EF 18%, now rate-controlled on Toprol 150 BID and Digoxin. Hospitalization complicated by new onset acute systolic heart failure. . Pt now s/p cath which revealed non-obstructive CAD.    ALL: chocolate   PSHx: Left ear excision of cholesteatoma , Left inguinal hernia repair   FMHx: Mother: Alive, 91 yr, had colon cancer, DM, Aortic valve replacement Father:  from Stomach cancer  Social Hx: denies tobacco or illicit drug use; + etoh use, admits to 1.5 glasses of wine at party     Current Rx: Eliquis 5 BID, BASA, Lipitor 40, Digoxin 0.125, Synthroid 25, Toprol 150 BID, Oxybutynin 10, Flomax, Bactrim     VS: BP /42-89, P 58-98, RR 18, Afebrile, sat 94-98% on RA   GENERAL:   EYES:   NECK:   CVD:   PULM  GI:   EXTREMITIES:   NEUROLOGY:    Labs: pending for                18.6   5.55  )-----------( 170      ( 2019 06:44 )             56.9     141  |  108  |  20  ----------------------------<  89  5.1   |  26  |  1.43<H>    Ca    9.5      2019 06:44    TPro  7.2  /  Alb  3.7  /  TBili  1.0  /  DBili  x   /  AST  46<H>  /  ALT  98<H>  /  AlkPhos  93  02-04    CARDIAC MARKERS ( 2019 09:22 )  0.040 ng/mL / x     / x     / x     / x      CARDIAC MARKERS ( 2019 17:53 )  0.029 ng/mL / x     / x     / x     / x        Imaging:   EK/26: Atrial flutter with variable block, 169bpm, PRWP    Echo: 1/28:   The left ventricle cavity is mildly dilated. Left ventricle systolic function appears severely impaired globally along with segmental wall motion abnormalities noted. Severe hypokinesis of the inferior, inferoseptum, anteroseptum and anterior walls. The apical cap is  hypokinetic and free of thrombus. Estimated ejection fraction is 18% via  bi-plane method.   The left atrium is moderately dilated.The right atrium appears borderline dilated.   The right ventricle is borderline dilated with mildly impaired function.   The aortic valve is well visualized, appears mildly sclerotic. Aortic  valve leaflet excursion is preserved.  Trace aortic regurgitation is present.   The mitral valve leaflets are well seen thin and pliable; preserved leaflet excursion noted. Trace to mild mitral valve regurgitation noted.Redundant chordae noted.   EA reversal of the mitral inflow consistent with reduced compliance of  the  left ventricle (When appreciable.)   The tricuspid valve leaflets are well seen and appear thin and pliable with preserved leaflets excursion. Trace tricuspid regurgitation noted.   The pulmonic valve leaflets appear thin and pliable. Trace to mild  pulmonic regurgitation noted.   No evidence of pericardial effusion. No evidence of pleural effusion.    Cath: : non ischemic CAD, RHC with no elevated pressures 62 yo M with PMHx HTN, HLD, hypothyroidism, BPH presented with AMS. Pt was at a party and suddenly began to have slurred speech, facial droop, and diaphoresis, lasted about 5 minutes. Pt found to have acute R insular cortex and parietal cortex CVA, with newly discovered A Fib RVR. Pt placed on AC, transitioned to Eliquis, initially placed on cardizem gtt, but found to have EF 18%, now rate-controlled on Toprol 150 BID and Digoxin. Hospitalization complicated by new onset acute systolic heart failure. Pt now s/p cath which revealed non-obstructive CAD.  Pt feels well, denies any active complaints. Pt denies any history of drug use or excessive alcohol use. Pt had good exercise regimen prior to hospitalization, denies any supplement or steroid use. Denies any FMHx of heart failure.   Denies CP, SOB, LUNA, palpitations, lightheadedness, dizziness.     ALL: chocolate   PSHx: Left ear excision of cholesteatoma , Left inguinal hernia repair   FMHx: Mother: Alive, 91 yr, had colon cancer, DM, Aortic valve replacement Father:  from Stomach cancer  Social Hx: denies tobacco or illicit drug use; + etoh use, admits to 1.5 glasses of wine at party     Current Rx: Eliquis 5 BID, BASA, Lipitor 40, Digoxin 0.125, Synthroid 25, Toprol 150 BID, Oxybutynin 10, Flomax, Bactrim     VS: BP /42-89, P 58-98, RR 18, Afebrile, sat 94-98% on RA   GENERAL: NAD, laying comfortably in bed   NECK: No JVD  CVD: Irregularly Irregular, no m/r/g noted   PULM: CTA B/L with good inspiratory effort  EXTREMITIES: No c/c/e  NEUROLOGY: no focal deficits noted     Labs: pending for                18.6   5.55  )-----------( 170      ( 2019 06:44 )             56.9     141  |  108  |  20  ----------------------------<  89  5.1   |  26  |  1.43<H>    Ca    9.5      2019 06:44    TPro  7.2  /  Alb  3.7  /  TBili  1.0  /  DBili  x   /  AST  46<H>  /  ALT  98<H>  /  AlkPhos  93  02-04    CARDIAC MARKERS ( 2019 09:22 )  0.040 ng/mL / x     / x     / x     / x      CARDIAC MARKERS ( 2019 17:53 )  0.029 ng/mL / x     / x     / x     / x        Imaging:   EK/26: Atrial flutter with variable block, 169bpm, PRWP  Telemetry: HR currently 90s, went up to 120s overnight, with one pause this morning; yesterday rates were much higher 140-150s     Echo: :   The left ventricle cavity is mildly dilated. Left ventricle systolic function appears severely impaired globally along with segmental wall motion abnormalities noted. Severe hypokinesis of the inferior, inferoseptum, anteroseptum and anterior walls. The apical cap is  hypokinetic and free of thrombus. Estimated ejection fraction is 18% via  bi-plane method.   The left atrium is moderately dilated.The right atrium appears borderline dilated.   The right ventricle is borderline dilated with mildly impaired function.   The aortic valve is well visualized, appears mildly sclerotic. Aortic  valve leaflet excursion is preserved.  Trace aortic regurgitation is present.   The mitral valve leaflets are well seen thin and pliable; preserved leaflet excursion noted. Trace to mild mitral valve regurgitation noted.Redundant chordae noted.   EA reversal of the mitral inflow consistent with reduced compliance of  the  left ventricle (When appreciable.)   The tricuspid valve leaflets are well seen and appear thin and pliable with preserved leaflets excursion. Trace tricuspid regurgitation noted.   The pulmonic valve leaflets appear thin and pliable. Trace to mild  pulmonic regurgitation noted.   No evidence of pericardial effusion. No evidence of pleural effusion.    Cath: : non ischemic CAD, RHC with no elevated pressures

## 2019-02-05 NOTE — CONSULT NOTE ADULT - REASON FOR ADMISSION
complain of slurry speech and altered mental status

## 2019-02-05 NOTE — CONSULT NOTE ADULT - ASSESSMENT
64 yo M with PMHx HTN, HLD, hypothyroidism, BPH presented with AMS, found to have acute R insular cortex and parietal cortex CVA, with newly discovered A Fib RVR.  Pt found to have newly discovered A Fib as well as HFrEF 18%, s/p cardiac cath which revealed non-obstructive CAD, with RHC with no elevated pressures; currently rate-controlled on Toprol and Digoxin.   EP consult placed to evaluate for life vest. 62 yo M with PMHx HTN, HLD, hypothyroidism, BPH presented with AMS, found to have acute R insular cortex and parietal cortex CVA, with newly discovered A Fib RVR.  Pt found to have newly discovered A Fib as well as HFrEF 18%, s/p cardiac cath which revealed non-obstructive CAD, with RHC with no elevated pressures; rate appears improved today, currently on Toprol and Digoxin.   EP consult placed to evaluate for life vest.

## 2019-02-05 NOTE — PROGRESS NOTE ADULT - SUBJECTIVE AND OBJECTIVE BOX
HOSPITALIST ATTENDING PROGRESS NOTE    Chart and meds reviewed.  Patient seen and examined.    HPI: 64 yo male presents to the ED  with complain of episode of AMS today. Patient was at a party today when he suddenly began having slurred speech, facial droop, and diaphoresis. he also had +urinary incontinence. Episode lasted approximately 5 minutes, and then symptoms completely resolved. No LOC. Then he started to have SOB. He still felt SOB during his presentation in ED. As per patient, he has been "not feeling well" for a month, with palpitations and SOB described as "throat tightness." Patient was seen by PMD, was prescribed Augmentin for possible sinus infection, but with no relief of SOB. Patient has been taking Tylenol PM for symptoms and for sleep aid. Patient also has a cyst on his abd, for which he is on abx, doxycycline. Denies tobacco use, illicit drug use. Pt drank 1.5 glasses of wine at party today. Patient has been on a diet to lose weight, has decreased his portion sizes and decreased sugary and fatty foods in his diet, does not eat red meat. Exercises 3-4 days per week.    1/30/19 pt seen and examined, cath postponed due to being on eliquis. received AM dose, DC eliquis, start heparin gtt in evening, stop prior to cath.    1/31/19 awaiting cath today, HR uncontrolled with walking especially.     2/1/19 pt seen and examined, cath yesterday with non ischemic CAD, RHC with no elevated pressures, needs rate control.     2/2/19 feels well, rate not controlled, especially with ambulation, meds changed by cardio, digoxin today IV, changed to toprol    2/3/19 rate not well controlled, EP eval pending    2/4/19 pt seen and examined, rate still high with ambulation. d/w     2/5/19 pt seen and examined, for cardioversion on thursday. asked to hydrate      All 10 systems reviewed and found to be negative with the exception of what has been described above.    MEDICATIONS  (STANDING):  apixaban 5 milliGRAM(s) Oral every 12 hours  aspirin  chewable 81 milliGRAM(s) Oral daily  atorvastatin 40 milliGRAM(s) Oral at bedtime  digoxin     Tablet 0.125 milliGRAM(s) Oral daily  influenza   Vaccine 0.5 milliLiter(s) IntraMuscular once  levothyroxine 25 MICROGram(s) Oral daily  metoprolol succinate  milliGRAM(s) Oral two times a day  oxybutynin 10 milliGRAM(s) Oral daily  tamsulosin 0.4 milliGRAM(s) Oral at bedtime  trimethoprim  160 mG/sulfamethoxazole 800 mG 1 Tablet(s) Oral two times a day    MEDICATIONS  (PRN):  metoprolol tartrate Injectable 5 milliGRAM(s) IV Push every 6 hours PRN HR >130      VITALS:  T(F): 97.8 (02-05-19 @ 17:25), Max: 97.8 (02-05-19 @ 10:29)  HR: 63 (02-05-19 @ 17:25) (63 - 98)  BP: 104/66 (02-05-19 @ 17:25) (91/68 - 110/47)  RR: 18 (02-05-19 @ 17:25) (18 - 18)  SpO2: 99% (02-05-19 @ 17:25) (98% - 99%)  Wt(kg): --    I&O's Summary      CAPILLARY BLOOD GLUCOSE          PHYSICAL EXAM:    HEENT:  pupils equal and reactive, EOMI, no oropharyngeal lesions, erythema, exudates, oral thrush    NECK:   supple, no carotid bruits, no palpable lymph nodes, no thyromegaly    CV:  +S1, +S2, regular, no murmurs or rubs    RESP:   lungs clear to auscultation bilaterally, no wheezing, rales, rhonchi, good air entry bilaterally    BREAST:  not examined    GI:  abdomen soft, non-tender, non-distended, normal BS, no bruits, no abdominal masses, no palpable masses    RECTAL:  not examined    :  not examined    MSK:   normal muscle tone, no atrophy, no rigidity, no contractions    EXT:   no clubbing, no cyanosis, no edema, no calf pain, swelling or erythema    VASCULAR:  pulses equal and symmetric in the upper and lower extremities    NEURO:  AAOX3, no focal neurological deficits, follows all commands, able to move extremities spontaneously    SKIN:  no ulcers, lesions or rashes    LABS:                            19.1   7.25  )-----------( 180      ( 05 Feb 2019 06:24 )             56.7     02-04    141  |  108  |  20  ----------------------------<  89  5.1   |  26  |  1.43<H>    Ca    9.5      04 Feb 2019 06:44    TPro  7.2  /  Alb  3.7  /  TBili  1.0  /  DBili  x   /  AST  46<H>  /  ALT  98<H>  /  AlkPhos  93  02-04        LIVER FUNCTIONS - ( 04 Feb 2019 06:44 )  Alb: 3.7 g/dL / Pro: 7.2 gm/dL / ALK PHOS: 93 U/L / ALT: 98 U/L / AST: 46 U/L / GGT: x                                             CULTURES:

## 2019-02-05 NOTE — CONSULT NOTE ADULT - PROBLEM SELECTOR RECOMMENDATION 9
consider MT of head wo danitza.  cardio w/u in progress, MIHIR  check lipids, statin.  will need to be on LTAC.
Newly discovered HFrEF 18%; currently euvolemic, not in acute exacerbation  Cath revealed non-obstructive CAD, with normal RHC  Continue with goal directed therapy  Consider addition of low dose ACEI if BP, potassium, and Cre allows   Will re-evaluate for AICD in 3 months   No need for life vest at this time

## 2019-02-05 NOTE — PROGRESS NOTE ADULT - ASSESSMENT
Assessment and Plan:    1. 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 Afib: not controlled  - changed to metoprolol 150 bid  - digoxin IV given  - anticoagulation with Eliquis   - cardioversion on thursday    3. Abd cyst:  cw Bactrim    4. New onset systolic CHF EF 18%  - hold on ACEI as patient with KRISTYN and needs more adjustment with meds for rate control   - continue statin, beta blockers  - may need cardiology follow up as outpatient to see EF improves, if not will need ICD   - EP eval apprecaited no AICD    5. Erythrocytosis: hydrate po  - monitor cbc, hemeonc w/u as outpt    outpt cardio, neuro, PMD follow up

## 2019-02-05 NOTE — PROGRESS NOTE ADULT - SUBJECTIVE AND OBJECTIVE BOX
Patient is a 63y old  Male who presents with a chief complaint of complain of slurry speech and altered mental status.       HPI:  62 yo male presents to the ED  with complain of episode of AMS today. Patient was at a party today when he suddenly began having slurred speech, facial droop, and diaphoresis. he also had +urinary incontinence. Episode lasted approximately 5 minutes, and then symptoms completely resolved. No LOC.  During the hosptial course he was noted to have afib wtih RVR and was started on cardizem drip.  He was also started on heparin drip for anticoagulation    - pt seen and examined by me today. Pt denies any new symptoms today.   - pt seen and examined by me today. c/o anxiety    - pt seen and examined by me today. Pt denies any CP or SOB.   - pt seen and examined by me today. Pt denies any CP or SOB.    /- pt seen and examined by me today. denies any symptoms.     /3- pt seen and examined by me today. Denies any symptoms.     - pt seen and examined by me today. He still has afib with tachycardia.     - pt seen and examined by me today. He denies any SOB.     Family Hx:  Mother: Alive, 91 yr, had colon cancer, DM, Aortic valve replacement  Father:  from Stomach cancer    PMHx:  BPH  Hypothyroidism     PSHx:  No recent surgeries. (2019 00:15)      PAST MEDICAL & SURGICAL HISTORY:  HLD (hyperlipidemia)  Hypercholesterolemia: controlled by diet and exercise  HTN - Hypertension: borderline controlled by diet/  exercise  Left Ear Excision of Cholesteotoma:   Left Inguinal Hernia Repair: @ 8 years old      MEDICATIONS  (STANDING):  diazepam    Tablet 2 milliGRAM(s) Oral once  diltiazem    Tablet 30 milliGRAM(s) Oral every 6 hours  diltiazem Infusion 10 mG/Hr (10 mL/Hr) IV Continuous <Continuous>  doxycycline hyclate Capsule 100 milliGRAM(s) Oral every 12 hours  heparin  Infusion.  Unit(s)/Hr (20 mL/Hr) IV Continuous <Continuous>  influenza   Vaccine 0.5 milliLiter(s) IntraMuscular once  levothyroxine 25 MICROGram(s) Oral daily  metoprolol tartrate 25 milliGRAM(s) Oral two times a day  oxybutynin 10 milliGRAM(s) Oral daily  tamsulosin 0.4 milliGRAM(s) Oral at bedtime    MEDICATIONS  (PRN):  heparin  Injectable 9000 Unit(s) IV Push every 6 hours PRN For aPTT less than 40  heparin  Injectable 4500 Unit(s) IV Push every 6 hours PRN For aPTT between 40 - 57      FAMILY HISTORY:      SOCIAL HISTORY:   ex smoker    REVIEW OF SYSTEMS:  CONSTITUTIONAL:    No fatigue, malaise, lethargy.  No fever or chills.  HEENT:  Eyes:  No visual changes.     ENT:  No epistaxis.  No sinus pain.    RESPIRATORY:  No cough.  No wheeze.  No hemoptysis.  No shortness of breath.  CARDIOVASCULAR:  No chest pains.  No palpitations. No shortness of breath, No orthopnea or PND.  GASTROINTESTINAL:  No abdominal pain.  No nausea or vomiting.    GENITOURINARY:    No hematuria.    MUSCULOSKELETAL:  No musculoskeletal pain.  No joint swelling.  No arthritis.  NEUROLOGICAL:  No tingling or numbness or weakness.  PSYCHIATRIC:  No confusion  SKIN:  No rashes.    ENDOCRINE:  No unexplained weight loss.  No polydipsia.   HEMATOLOGIC:  No anemia.  No prolonged or excessive bleeding.   ALLERGIC AND IMMUNOLOGIC:  No pruritus.          Vital Signs Last 24 Hrs  T(C): 36.7 (2019 04:36), Max: 36.8 (2019 17:14)  T(F): 98 (2019 04:36), Max: 98.2 (2019 17:14)  HR: 72 (2019 04:36) (72 - 117)  BP: 108/70 (2019 04:36) (106/59 - 121/73)  BP(mean): --  RR: 18 (2019 17:14) (18 - 18)  SpO2: 98% (2019 04:36) (96% - 99%)    PHYSICAL EXAM-    Constitutional: no acute distress    Head: Head is normocephalic and atraumatic.      Neck:  There are strong carotid pulses bilaterally. No JVD.     Cardiovascular: irregular rate and tachycardic.      Respiratory: Breathsounds are normal. No rales. No wheezing.    Abdomen: Soft, nontender, nondistended with positive bowel sounds.      Extremity: No tenderness. No  pitting edema     Neurologic: The patient is alert and oriented.      Skin: No rash, no obvious lesions noted.      Psychiatric: The patient appears to be emotionally stable.      INTERPRETATION OF TELEMETRY: afib 100/min    ECG:     I&O's Detail    2019 07:01  -  2019 07:00  --------------------------------------------------------  IN:    diltiazem Infusion: 248 mL    heparin  Infusion.: 387.9 mL  Total IN: 635.9 mL    OUT:  Total OUT: 0 mL    Total NET: 635.9 mL          LABS:                        15.5   6.55  )-----------( 155      ( 2019 06:55 )             46.6         145  |  117<H>  |  13  ----------------------------<  97  4.1   |  21<L>  |  1.25    Ca    8.8      2019 09:22  Mg     1.9         TPro  6.4  /  Alb  3.4  /  TBili  1.8<H>  /  DBili  x   /  AST  43<H>  /  ALT  126<H>  /  AlkPhos  68      CARDIAC MARKERS ( 2019 09:22 )  0.040 ng/mL / x     / x     / x     / x      CARDIAC MARKERS ( 2019 17:53 )  0.029 ng/mL / x     / x     / x     / x          PT/INR - ( 2019 17:53 )   PT: 16.5 sec;   INR: 1.47 ratio         PTT - ( 2019 06:56 )  PTT:84.3 sec  Urinalysis Basic - ( 2019 19:30 )    Color: Yellow / Appearance: Clear / S.025 / pH: x  Gluc: x / Ketone: Negative  / Bili: Negative / Urobili: Negative mg/dL   Blood: x / Protein: 15 mg/dL / Nitrite: Negative   Leuk Esterase: Trace / RBC: Negative /HPF / WBC 3-5   Sq Epi: x / Non Sq Epi: Occasional / Bacteria: Negative      I&O's Summary    2019 07:01  -  2019 07:00  --------------------------------------------------------  IN: 635.9 mL / OUT: 0 mL / NET: 635.9 mL      BNP  RADIOLOGY & ADDITIONAL STUDIES:  < from: CT Head No Cont (19 @ 18:17) >  IMPRESSION:     No acute intracranial bleeding, mass effect, or shift. Mild chronic   microvascular ischemic changes.                 NOEMI PEREIRA   This document has been electronically signed. 2019  6:26PM    < end of copied text >  < from: Transthoracic Echocardiogram (19 @ 12:02) >   Summary     The left ventricle cavity is mildly dilated. Left ventricle systolic   function appears severely impaired globally along with segmental wall   motion abnormalities noted. Severe hypokinesis of the inferior,   inferoseptum, anteroseptum and anterior walls. The apical cap is   hypokinetic and free of thrombus. Estimated ejection fraction is 18% via   bi-plane method.   The left atrium is moderately dilated.   The right atrium appears borderline dilated.   The right ventricle is borderline dilated with mildly impaired function.   The aortic valve is well visualized, appears mildly sclerotic. Aortic   valve leaflet excursion is preserved.   Trace aortic regurgitation is present.   The mitral valve leaflets are well seen thin and pliable; preserved   leaflet excursion noted. Trace to mild mitral valve regurgitation noted.   Redundant chordae noted.   EA reversal of the mitral inflow consistent with reduced compliance of   the   left ventricle (When appreciable.)   The tricuspid valve leaflets are well seen and appear thin and pliable   with preserved leaflets excursion. Trace tricuspid regurgitation noted.   The pulmonic valve leaflets appear thin and pliable. Trace to mild   pulmonic regurgitation noted.   No evidence of pericardial effusion.   No evidence of pleural effusion.     Signature     ----------------------------------------------------------------   Electronically signed by Mark Dueñas MD(Interpreting   physician) on 2019 05:48 PM   ----------------------------------------------------------------    < end of copied text >  < from: MR Head No Cont (19 @ 10:16) >  IMPRESSION:    small subcentimeter foci of restricted diffusion in the   RIGHT insular cortex and RIGHT parietal cortex stent with acute   infarctions. Bilateral mastoiditis is noted.                ELSY HATCH M.D., ATTENDING RADIOLOGIST  This document has been electronically signed. 2019 10:42AM    < end of copied text >

## 2019-02-05 NOTE — PROGRESS NOTE ADULT - ASSESSMENT
Slurred speech- Appreciate neurology team input.  MRI results noted.  He was noted to have small foci consistent with acute infarcts per MRI report.  Likely embolic in nature  Anticoagulation management as stated below.  Pt is on full dose anticoauglation now. Will hold off on MIHIR for now.    Afib with RVR -poor rate control but better last night compared to the prior nights.  For MIHIR CV on thursday am.  Keep pt NPOPMN on Wednesday night.   Continue toprol XL and digoxin.    Continue ELliquis.   He had a recent CVA.       Newly diagnosed HFrEF- LVEF 18%. Dilated cardiomyopathy with wall motion abnormalities as stated above.  Non ischemic cardiomyopathy- LHC did not reveal any obstructive CAD.    GDMT for HFrEF as stated above.   WIll hold lisinopril to accommodate rate control agents.|  Appears euvolemic on exam.       HTN- meds as stated above.     Hyperlipdiemia- statin     Other medical issues- Management per primary team.  Thank you for allowing me to participate in the care of this patient. Please feel free to contact me with any questions.

## 2019-02-05 NOTE — CONSULT NOTE ADULT - PROBLEM SELECTOR RECOMMENDATION 2
Appears more rate-controlled today  CHADSVASC = 3, on Eliquis  Continue with Toprol and Digoxin  Agree with plan for MIHIR/DCCV as patient will be on 2 weeks of AC later this week

## 2019-02-06 DIAGNOSIS — I50.21 ACUTE SYSTOLIC (CONGESTIVE) HEART FAILURE: ICD-10-CM

## 2019-02-06 DIAGNOSIS — I50.9 HEART FAILURE, UNSPECIFIED: ICD-10-CM

## 2019-02-06 LAB
ALBUMIN SERPL ELPH-MCNC: 3.3 G/DL — SIGNIFICANT CHANGE UP (ref 3.3–5)
ALP SERPL-CCNC: 88 U/L — SIGNIFICANT CHANGE UP (ref 40–120)
ALT FLD-CCNC: 89 U/L — HIGH (ref 12–78)
ANION GAP SERPL CALC-SCNC: 4 MMOL/L — LOW (ref 5–17)
AST SERPL-CCNC: 42 U/L — HIGH (ref 15–37)
BILIRUB SERPL-MCNC: 1.2 MG/DL — SIGNIFICANT CHANGE UP (ref 0.2–1.2)
BUN SERPL-MCNC: 19 MG/DL — SIGNIFICANT CHANGE UP (ref 7–23)
CALCIUM SERPL-MCNC: 8.7 MG/DL — SIGNIFICANT CHANGE UP (ref 8.5–10.1)
CHLORIDE SERPL-SCNC: 110 MMOL/L — HIGH (ref 96–108)
CO2 SERPL-SCNC: 25 MMOL/L — SIGNIFICANT CHANGE UP (ref 22–31)
CREAT SERPL-MCNC: 1.46 MG/DL — HIGH (ref 0.5–1.3)
GLUCOSE SERPL-MCNC: 89 MG/DL — SIGNIFICANT CHANGE UP (ref 70–99)
HCT VFR BLD CALC: 54 % — HIGH (ref 39–50)
HGB BLD-MCNC: 18.1 G/DL — HIGH (ref 13–17)
MCHC RBC-ENTMCNC: 28.9 PG — SIGNIFICANT CHANGE UP (ref 27–34)
MCHC RBC-ENTMCNC: 33.5 GM/DL — SIGNIFICANT CHANGE UP (ref 32–36)
MCV RBC AUTO: 86.3 FL — SIGNIFICANT CHANGE UP (ref 80–100)
NRBC # BLD: 0 /100 WBCS — SIGNIFICANT CHANGE UP (ref 0–0)
PLATELET # BLD AUTO: 157 K/UL — SIGNIFICANT CHANGE UP (ref 150–400)
POTASSIUM SERPL-MCNC: 4.4 MMOL/L — SIGNIFICANT CHANGE UP (ref 3.5–5.3)
POTASSIUM SERPL-SCNC: 4.4 MMOL/L — SIGNIFICANT CHANGE UP (ref 3.5–5.3)
PROT SERPL-MCNC: 6.5 GM/DL — SIGNIFICANT CHANGE UP (ref 6–8.3)
RBC # BLD: 6.26 M/UL — HIGH (ref 4.2–5.8)
RBC # FLD: 12.6 % — SIGNIFICANT CHANGE UP (ref 10.3–14.5)
SODIUM SERPL-SCNC: 139 MMOL/L — SIGNIFICANT CHANGE UP (ref 135–145)
WBC # BLD: 5.56 K/UL — SIGNIFICANT CHANGE UP (ref 3.8–10.5)
WBC # FLD AUTO: 5.56 K/UL — SIGNIFICANT CHANGE UP (ref 3.8–10.5)

## 2019-02-06 RX ADMIN — APIXABAN 5 MILLIGRAM(S): 2.5 TABLET, FILM COATED ORAL at 06:39

## 2019-02-06 RX ADMIN — Medication 81 MILLIGRAM(S): at 11:17

## 2019-02-06 RX ADMIN — Medication 1 TABLET(S): at 18:41

## 2019-02-06 RX ADMIN — Medication 10 MILLIGRAM(S): at 11:17

## 2019-02-06 RX ADMIN — Medication 1 TABLET(S): at 06:39

## 2019-02-06 RX ADMIN — ATORVASTATIN CALCIUM 40 MILLIGRAM(S): 80 TABLET, FILM COATED ORAL at 20:26

## 2019-02-06 RX ADMIN — TAMSULOSIN HYDROCHLORIDE 0.4 MILLIGRAM(S): 0.4 CAPSULE ORAL at 20:26

## 2019-02-06 RX ADMIN — Medication 25 MICROGRAM(S): at 06:39

## 2019-02-06 RX ADMIN — Medication 150 MILLIGRAM(S): at 06:39

## 2019-02-06 RX ADMIN — Medication 150 MILLIGRAM(S): at 19:22

## 2019-02-06 RX ADMIN — Medication 0.12 MILLIGRAM(S): at 06:39

## 2019-02-06 RX ADMIN — APIXABAN 5 MILLIGRAM(S): 2.5 TABLET, FILM COATED ORAL at 18:41

## 2019-02-06 NOTE — PROGRESS NOTE ADULT - SUBJECTIVE AND OBJECTIVE BOX
HOSPITALIST ATTENDING PROGRESS NOTE    Chart and meds reviewed.  Patient seen and examined.    HPI: 64 yo male presents to the ED  with complain of episode of AMS today. Patient was at a party today when he suddenly began having slurred speech, facial droop, and diaphoresis. he also had +urinary incontinence. Episode lasted approximately 5 minutes, and then symptoms completely resolved. No LOC. Then he started to have SOB. He still felt SOB during his presentation in ED. As per patient, he has been "not feeling well" for a month, with palpitations and SOB described as "throat tightness." Patient was seen by PMD, was prescribed Augmentin for possible sinus infection, but with no relief of SOB. Patient has been taking Tylenol PM for symptoms and for sleep aid. Patient also has a cyst on his abd, for which he is on abx, doxycycline. Denies tobacco use, illicit drug use. Pt drank 1.5 glasses of wine at party today. Patient has been on a diet to lose weight, has decreased his portion sizes and decreased sugary and fatty foods in his diet, does not eat red meat. Exercises 3-4 days per week.    1/30/19 pt seen and examined, cath postponed due to being on eliquis. received AM dose, DC eliquis, start heparin gtt in evening, stop prior to cath.    1/31/19 awaiting cath today, HR uncontrolled with walking especially.     2/1/19 pt seen and examined, cath yesterday with non ischemic CAD, RHC with no elevated pressures, needs rate control.     2/2/19 feels well, rate not controlled, especially with ambulation, meds changed by cardio, digoxin today IV, changed to toprol    2/3/19 rate not well controlled, EP eval pending    2/4/19 pt seen and examined, rate still high with ambulation. d/w     2/5/19 pt seen and examined, for cardioversion on thursday. asked to hydrate    2/6/19 pt seen and examined, for MIHIR and cardioversion tomm.      All 10 systems reviewed and found to be negative with the exception of what has been described above.    MEDICATIONS  (STANDING):  apixaban 5 milliGRAM(s) Oral every 12 hours  aspirin  chewable 81 milliGRAM(s) Oral daily  atorvastatin 40 milliGRAM(s) Oral at bedtime  digoxin     Tablet 0.125 milliGRAM(s) Oral daily  influenza   Vaccine 0.5 milliLiter(s) IntraMuscular once  levothyroxine 25 MICROGram(s) Oral daily  metoprolol succinate  milliGRAM(s) Oral two times a day  oxybutynin 10 milliGRAM(s) Oral daily  tamsulosin 0.4 milliGRAM(s) Oral at bedtime  trimethoprim  160 mG/sulfamethoxazole 800 mG 1 Tablet(s) Oral two times a day    MEDICATIONS  (PRN):  metoprolol tartrate Injectable 5 milliGRAM(s) IV Push every 6 hours PRN HR >130      VITALS:  T(F): 97.4 (02-06-19 @ 10:39), Max: 97.8 (02-05-19 @ 17:25)  HR: 74 (02-06-19 @ 10:39) (52 - 74)  BP: 93/51 (02-06-19 @ 10:39) (93/51 - 112/67)  RR: 18 (02-06-19 @ 10:39) (17 - 18)  SpO2: 96% (02-06-19 @ 10:39) (96% - 99%)  Wt(kg): --    I&O's Summary      CAPILLARY BLOOD GLUCOSE          PHYSICAL EXAM:    HEENT:  pupils equal and reactive, EOMI, no oropharyngeal lesions, erythema, exudates, oral thrush    NECK:   supple, no carotid bruits, no palpable lymph nodes, no thyromegaly    CV:  +S1, +S2, regular, no murmurs or rubs    RESP:   lungs clear to auscultation bilaterally, no wheezing, rales, rhonchi, good air entry bilaterally    BREAST:  not examined    GI:  abdomen soft, non-tender, non-distended, normal BS, no bruits, no abdominal masses, no palpable masses    RECTAL:  not examined    :  not examined    MSK:   normal muscle tone, no atrophy, no rigidity, no contractions    EXT:   no clubbing, no cyanosis, no edema, no calf pain, swelling or erythema    VASCULAR:  pulses equal and symmetric in the upper and lower extremities    NEURO:  AAOX3, no focal neurological deficits, follows all commands, able to move extremities spontaneously    SKIN:  no ulcers, lesions or rashes    LABS:                            18.1   5.56  )-----------( 157      ( 06 Feb 2019 06:18 )             54.0     02-06    139  |  110<H>  |  19  ----------------------------<  89  4.4   |  25  |  1.46<H>    Ca    8.7      06 Feb 2019 06:18    TPro  6.5  /  Alb  3.3  /  TBili  1.2  /  DBili  x   /  AST  42<H>  /  ALT  89<H>  /  AlkPhos  88  02-06        LIVER FUNCTIONS - ( 06 Feb 2019 06:18 )  Alb: 3.3 g/dL / Pro: 6.5 gm/dL / ALK PHOS: 88 U/L / ALT: 89 U/L / AST: 42 U/L / GGT: x                                             CULTURES:

## 2019-02-06 NOTE — PROGRESS NOTE ADULT - ASSESSMENT
PHYSICAL EXAM:  GENERAL: NAD, well-developed  HEAD:  Atraumatic, Normocephalic  EYES: EOMI, PERRLA, conjunctiva and sclera clear  NECK: Supple, No JVD  CHEST/LUNG: Clear to auscultation bilaterally  HEART: IRRegular rate and rhythm; No murmurs, rubs, or gallops  ABDOMEN: Soft, Nontender, Nondistended; Bowel sounds present  EXTREMITIES:  2+ Peripheral Pulses, No clubbing, cyanosis, or edema  PSYCH: AAOx3  NEUROLOGY: non-focal  SKIN: No rashes or lesions

## 2019-02-06 NOTE — PROGRESS NOTE ADULT - SUBJECTIVE AND OBJECTIVE BOX
62 y/o male with MAS and diagnosed with CVA,  He is scheduled for MIHIR/CV in am. Telemetry show AT Fib with average -120. . At imes VR runs as high as 140-170 BPM.  He is a newly diagnosed HFrEF.      PMHx:  BPH  Hypothyroidism      PMHx:  BPH  Hypothyroidism     Family Hx:  Mother: Alive, 91 yr, had colon cancer, DM, Aortic valve replacement  Father:  from Stomach cancer      TELE: At Fib 100-120 BPM    MEDICATIONS  (STANDING):  apixaban 5 milliGRAM(s) Oral every 12 hours  aspirin  chewable 81 milliGRAM(s) Oral daily  atorvastatin 40 milliGRAM(s) Oral at bedtime  digoxin     Tablet 0.125 milliGRAM(s) Oral daily  influenza   Vaccine 0.5 milliLiter(s) IntraMuscular once  levothyroxine 25 MICROGram(s) Oral daily  metoprolol succinate  milliGRAM(s) Oral two times a day  oxybutynin 10 milliGRAM(s) Oral daily  tamsulosin 0.4 milliGRAM(s) Oral at bedtime  trimethoprim  160 mG/sulfamethoxazole 800 mG 1 Tablet(s) Oral two times a day    MEDICATIONS  (PRN):  metoprolol tartrate Injectable 5 milliGRAM(s) IV Push every 6 hours PRN HR >130      Allergies    chocolate (Nausea)  chocolate (Rash)  No Known Drug Allergies    Intolerances        Vital Signs Last 24 Hrs  T(C): 36.3 (2019 10:39), Max: 36.6 (2019 17:25)  T(F): 97.4 (2019 10:39), Max: 97.8 (2019 17:25)  HR: 74 (2019 10:39) (52 - 74)  BP: 93/51 (2019 10:39) (93/51 - 112/67)  BP(mean): --  RR: 18 (2019 10:39) (17 - 18)  SpO2: 96% (2019 10:39) (96% - 99%)    PHYSICAL EXAMINATION:    GENERAL APPEARANCE:  Pt. is not currently dyspneic, in no distress. Pt. is alert, oriented, and pleasant.  HEENT:  Pupils are normal and react normally. No icterus. Mucous membranes well colored.  NECK:  Supple. No lymphadenopathy. Jugular venous pressure not elevated. Carotids equal.   HEART:   The cardiac impulse has a normal quality. There are no murmurs, rubs or gallops noted  CHEST:  Chest is clear to auscultation. Normal respiratory effort.  ABDOMEN:  Soft and nontender.   EXTREMITIES:  There is no edema.   SKIN:  No rash or significant lesions are noted.    LABS:                        18.1   5.56  )-----------( 157      ( 2019 06:18 )             54.0     02-    139  |  110<H>  |  19  ----------------------------<  89  4.4   |  25  |  1.46<H>    Ca    8.7      2019 06:18    TPro  6.5  /  Alb  3.3  /  TBili  1.2  /  DBili  x   /  AST  42<H>  /  ALT  89<H>  /  AlkPhos  88  02-06              RADIOLOGY & ADDITIONAL TESTS:

## 2019-02-06 NOTE — PROGRESS NOTE ADULT - ASSESSMENT
ASSESSMENT & PLAN: 63 with year old male with CVA, in at fib with RVR and has newly diagnosed HFrEF (18%), with [id number] CAD.   Plan:  Pt to have MIHIR/CV in am  Keep pt NPO  Continue with Eliquis and maximize medical therapy

## 2019-02-06 NOTE — PROGRESS NOTE ADULT - SUBJECTIVE AND OBJECTIVE BOX
Patient is a 63y old  Male who presents with a chief complaint of complain of slurry speech and altered mental status (06 Feb 2019 15:45)    HPI:  64 yo male presents to the ED  with complain of episode of AMS today. Patient was at a party today when he suddenly began having slurred speech, facial droop, and diaphoresis. he also had +urinary incontinence. Episode lasted approximately 5 minutes, and then symptoms completely resolved. No LOC. Then he started to have SOB. He still felt SOB during his presentation in ED. As per patient, he has been "not feeling well" for a month, with palpitations and SOB described as "throat tightness." Patient was seen by PMD, was prescribed Augmentin for possible sinus infection, but with no relief of SOB. Patient has been taking Tylenol PM for symptoms and for sleep aid. Patient also has a cyst on his abd, for which he is on abx, doxycycline. Denies tobacco use, illicit drug use. Pt drank 1.5 glasses of wine at party today. Patient has been on a diet to lose weight, has decreased his portion sizes and decreased sugary and fatty foods in his diet, does not eat red meat. Exercises 3-4 days per week.  During my evaluation at the bed side patient was totally any symptom free. No slurry speech no sob, no palpitation.     2/6/19- pt denies any cp/sob.  states that he feels much better since hospitalization.  No neuro deficits noted.  Plan for MIHIR and possible cardioversion tomorrow AM for AFIB with RVR.  Currently on toprol XL-- consider ACE/ARB/ARNI when creatinine normalizes.  May also benefit from laura once creatinine is normal.  New onset CHF education done with patient, able to teach back and verbalizes understanding.  Scale given to patient for hospital use and to take home.  Enrolled in [facility] HF- [facility] study.      Transthoracic Echocardiogram:    EXAM:  ECHO TTE WO CON COMP W DOP         PROCEDURE DATE:  01/28/2019        INTERPRETATION:  Transthoracic Echocardiography Report (TTE)     Demographics     Patient name          MARTIN KRAUSE     Age           63 year(s)     Med Rec #   [id number]          Gender        Male     Account #             [id number]            Date of Birth 1955     Interpreting          Merle Patrick MD    Room Number   0349   Physician     Referring Physician   Carola Power      Sonographer   Kenneth Green RDCS     Date of study         01/28/2019 10:59                         AM     Height                73.62 in           Weight        251.33 pounds    Type of Study:     TTE procedure: ECHO TTE WO CON COMP W DOP     BP: 108/70 mmHg     Contrast Medium: Definity.     Study Location: 3ET[facility]chnical Quality: Good    Indications   1) I48.92 - Unspecified artial flutter    M-Mode Measurements (cm)     LVEDd: 6.08 cm            LVESd: 5.55 cm   IVSEd: 0.95 cm   LVPWd: 0.9 cm             AO Root Dimension: 3.7 cm                             ACS: 1.8 cm                             LA: 4.7 cm                             LVOT: 2.3 cm    Doppler Measurements:     AV Velocity:81.9 cm/s              MV Peak E-Wave: 96.3 cm/s   AV Peak Gradient: 2.68mmHg        MV Peak A-Wave: 66.6 cm/s                                      MV E/A Ratio: 1.45 %   TR Velocity:191 cm/s               MV Peak Gradient: 3.71 mmHg   TR Gradient:14.5924 mmHg   Estimated RAP:8 mmHg   RVSP:25 mmHg     Findings     Mitral Valve   The mitral valve leaflets are well seen thin and pliable; preserved   leaflet excursion noted. Trace to mild mitral valve regurgitation noted.   Redundant chordae noted.   EA reversal of the mitral inflow consistent with reduced compliance of   the   left ventricle (When appreciable.)     Aortic Valve   The aortic valve is well visualized, appears mildly sclerotic. Aortic   valve leaflet excursion is preserved.   Trace aortic regurgitation is present.     Tricuspid Valve   The tricuspid valve leaflets are well seen and appear thin and pliable   with preserved leaflets excursion. Trace tricuspid regurgitation noted.     Pulmonic Valve   The pulmonic valve leaflets appear thin and pliable. Trace to mild   pulmonic regurgitation noted.     Left Atrium   The left atrium is moderately dilated.     Left Ventricle   The left ventricle cavity is mildly dilated. Left ventricle systolic   function appears severely impaired globally along with segmental wall   motion abnormalities noted. Severe hypokinesis of the inferior,   inferoseptum, anteroseptum and anterior walls. The apical cap is   hypokinetic and free of thrombus. Estimated ejection fraction is 18% via   bi-plane method.     Right Atrium   The right atrium appears borderline dilated.     Right Ventricle   The right ventricle is borderline dilated with mildly impaired function.     Pericardial Effusion   No evidence of pericardial effusion.     Pleural Effusion   No evidence of pleural effusion.     Miscellaneous   All visualized extra cardiac structures appears to be normal.     Impression     Summary     The left ventricle cavity is mildly dilated. Left ventricle systolic   function appears severely impaired globally along with segmental wall   motion abnormalities noted. Severe hypokinesis of the inferior,   inferoseptum, anteroseptum and anterior walls. The apical cap is   hypokinetic and free of thrombus. Estimated ejection fraction is 18% via   bi-plane method.   The left atrium is moderately dilated.   The right atrium appears borderline dilated.   The right ventricle is borderline dilated with mildly impaired function.   The aortic valve is well visualized, appears mildly sclerotic. Aortic   valve leaflet excursion is preserved.   Trace aortic regurgitation is present.   The mitral valve leaflets are well seen thin and pliable; preserved   leaflet excursion noted. Trace to mild mitral valve regurgitation noted.   Redundant chordae noted.   EA reversal of the mitral inflow consistent with reduced compliance of   the   left ventricle (When appreciable.)   The tricuspid valve leaflets are well seen and appear thin and pliable   with preserved leaflets excursion. Trace tricuspid regurgitation noted.   The pulmonic valve leaflets appear thin and pliable. Trace to mild   pulmonic regurgitation noted.   No evidence of pericardial effusion.   No evidence of pleural effusion.     Signature     ----------------------------------------------------------------   Electronically signed by Merle Patrick MD(Interpreting   physician) on 01/28/2019 05:48 PM   ----------------------------------------------------------------    Valves     Mitral Valve     Peak E-Wave: 96.3 cm/s   Peak A-Wave: 66.6 cm/s   Peak Gradient: 3.71 mmHg                 E/A Ratio: 1.45     Aortic Valve     Peak Velocity: 81.9 cm/s   Peak Gradient: 2.68 mmHg     Cusp Separation: 1.8 cm     Tricuspid Valve     TR Velocity: 191 cm/s                  Estimated RAP: 8 mmHg   TR Gradient: 14.5924 mmHg       Estimated RVSP: 25 mmHg     Pulmonic Valve              Estimated PASP: 22.59 mmHg     LVOT     LVOT Diameter: 2.3 cm    Structures     Left Atrium     LA Dimension: 4.7 cm                 LA Area: 33 cm^2   LA/Aorta: 1.27     Left Ventricle     Diastolic Dimension: 6.08 cm          Systolic Dimension: 5.55 cm   Septum Diastolic: 0.95 cm   PW Diastolic: 0.9 cm                  Area Systolic: 46.4 cm^2   Area Diastolic: 54.6 cm^2     FS: 8.72 %   LV Length: 10.3 cm   LVOT Diameter: 2.3 cm     Right Atrium     RA Systolic Pressure: 10 mmHg     Right Ventricle              RV Systolic Pressure: 22.59 mmHg     Miscellaneous     Aorta     Aortic Root: 3.7 cm   Ascending Aorta: 3.6 cm   LVOT Diameter: 2.3 cm                    MERLE PATRICK M.D., ATTENDING CARDIOLOGIST  This document has been electronically signed. Jan 28 2019  5:48PM             (01-28-19 @ 12:02)

## 2019-02-06 NOTE — PROGRESS NOTE ADULT - ASSESSMENT
1. Slurred speech- Appreciate neurology team input.  MRI results noted. He was noted to have small foci consistent with acute infarcts per MRI report.  Likely embolic in nature from afib. Anticoagulation management as stated below.  Pt is on full dose anticoauglation now. Awaiting MIHIR tomorrow AM.    2. Afib with RVR- rate controlled at present. Cont current meds.   For MIHIR CV on thursday am.  Keep pt NPO after MN Wednesday night.   Continue toprol XL and digoxin.    3. Anticoagulation. Continue ELliquis.   He had a recent CVA.     4. Newly diagnosed HFrEF- LVEF 18%. Dilated cardiomyopathy with wall motion abnormalities as stated above.  Non ischemic cardiomyopathy- LHC did not reveal any obstructive CAD.  GDMT for HFrEF as stated above.   WIll hold lisinopril to accommodate rate control agents. Appears euvolemic on exam.     5. HTN- meds as stated above.     6. Hyperlipdiemia- statin     7. NPO after MN.

## 2019-02-06 NOTE — PROGRESS NOTE ADULT - SUBJECTIVE AND OBJECTIVE BOX
Cardiology Progress Note  Patient is a 63y old  Male who presents with a chief complaint of complain of slurry speech and altered mental status.     HPI: 64 yo male presents to the ED  with complain of episode of AMS today. Patient was at a party today when he suddenly began having slurred speech, facial droop, and diaphoresis. he also had +urinary incontinence. Episode lasted approximately 5 minutes, and then symptoms completely resolved. No LOC.  During the hosptial course he was noted to have afib wtih RVR and was started on cardizem drip.  He was also started on heparin drip for anticoagulation    /. No CP/SOB. No fevers. No events last pm. Afib 70-90 on tele.   Awaiting MIHIR/CV in AM.     Family Hx: Mother: Alive, 91 yr, had colon cancer, DM, Aortic valve replacement  Father:  from Stomach cancer    PMHx:  BPH  Hypothyroidism     PSHx:  No recent surgeries. (2019 00:15)      PAST MEDICAL & SURGICAL HISTORY:  HLD (hyperlipidemia)  Hypercholesterolemia: controlled by diet and exercise  HTN - Hypertension: borderline controlled by diet/  exercise  Left Ear Excision of Cholesteotoma:   Left Inguinal Hernia Repair: @ 8 years old      MEDICATIONS  (STANDING):  diazepam    Tablet 2 milliGRAM(s) Oral once  diltiazem    Tablet 30 milliGRAM(s) Oral every 6 hours  diltiazem Infusion 10 mG/Hr (10 mL/Hr) IV Continuous <Continuous>  doxycycline hyclate Capsule 100 milliGRAM(s) Oral every 12 hours  heparin  Infusion.  Unit(s)/Hr (20 mL/Hr) IV Continuous <Continuous>  influenza   Vaccine 0.5 milliLiter(s) IntraMuscular once  levothyroxine 25 MICROGram(s) Oral daily  metoprolol tartrate 25 milliGRAM(s) Oral two times a day  oxybutynin 10 milliGRAM(s) Oral daily  tamsulosin 0.4 milliGRAM(s) Oral at bedtime    MEDICATIONS  (PRN):  heparin  Injectable 9000 Unit(s) IV Push every 6 hours PRN For aPTT less than 40  heparin  Injectable 4500 Unit(s) IV Push every 6 hours PRN For aPTT between 40 - 57      FAMILY HISTORY:      SOCIAL HISTORY:   ex smoker    REVIEW OF SYSTEMS:  CONSTITUTIONAL:    No fatigue, malaise, lethargy.  No fever or chills.  HEENT:  Eyes:  No visual changes.     ENT:  No epistaxis.  No sinus pain.    RESPIRATORY:  No cough.  No wheeze.  No hemoptysis.  No shortness of breath.  CARDIOVASCULAR:  No chest pains.  No palpitations. No shortness of breath, No orthopnea or PND.  GASTROINTESTINAL:  No abdominal pain.  No nausea or vomiting.    GENITOURINARY:    No hematuria.    MUSCULOSKELETAL:  No musculoskeletal pain.  No joint swelling.  No arthritis.  NEUROLOGICAL:  No tingling or numbness or weakness.  PSYCHIATRIC:  No confusion  SKIN:  No rashes.    ENDOCRINE:  No unexplained weight loss.  No polydipsia.   HEMATOLOGIC:  No anemia.  No prolonged or excessive bleeding.   ALLERGIC AND IMMUNOLOGIC:  No pruritus.          Vital Signs Last 24 Hrs  T(C): 36.7 (2019 04:36), Max: 36.8 (2019 17:14)  T(F): 98 (2019 04:36), Max: 98.2 (2019 17:14)  HR: 72 (2019 04:36) (72 - 117)  BP: 108/70 (2019 04:36) (106/59 - 121/73)  BP(mean): --  RR: 18 (2019 17:14) (18 - 18)  SpO2: 98% (2019 04:36) (96% - 99%)    PHYSICAL EXAM-    Constitutional: no acute distress    Head: Head is normocephalic and atraumatic.      Neck:  There are strong carotid pulses bilaterally. No JVD.     Cardiovascular: irregular rate and tachycardic.      Respiratory: Breathsounds are normal. No rales. No wheezing.    Abdomen: Soft, nontender, nondistended with positive bowel sounds.      Extremity: No tenderness. No  pitting edema     Neurologic: The patient is alert and oriented.      Skin: No rash, no obvious lesions noted.      Psychiatric: The patient appears to be emotionally stable.      INTERPRETATION OF TELEMETRY: afib 100/min    ECG:     I&O's Detail    2019 07:01  -  2019 07:00  --------------------------------------------------------  IN:    diltiazem Infusion: 248 mL    heparin  Infusion.: 387.9 mL  Total IN: 635.9 mL    OUT:  Total OUT: 0 mL    Total NET: 635.9 mL          LABS:                        15.5   6.55  )-----------( 155      ( 2019 06:55 )             46.6         145  |  117<H>  |  13  ----------------------------<  97  4.1   |  21<L>  |  1.25    Ca    8.8      2019 09:22  Mg     1.9         TPro  6.4  /  Alb  3.4  /  TBili  1.8<H>  /  DBili  x   /  AST  43<H>  /  ALT  126<H>  /  AlkPhos  68      CARDIAC MARKERS ( 2019 09:22 )  0.040 ng/mL / x     / x     / x     / x      CARDIAC MARKERS ( 2019 17:53 )  0.029 ng/mL / x     / x     / x     / x          PT/INR - ( 2019 17:53 )   PT: 16.5 sec;   INR: 1.47 ratio         PTT - ( 2019 06:56 )  PTT:84.3 sec  Urinalysis Basic - ( 2019 19:30 )    Color: Yellow / Appearance: Clear / S.025 / pH: x  Gluc: x / Ketone: Negative  / Bili: Negative / Urobili: Negative mg/dL   Blood: x / Protein: 15 mg/dL / Nitrite: Negative   Leuk Esterase: Trace / RBC: Negative /HPF / WBC 3-5   Sq Epi: x / Non Sq Epi: Occasional / Bacteria: Negative      I&O's Summary    2019 07:01  -  2019 07:00  --------------------------------------------------------  IN: 635.9 mL / OUT: 0 mL / NET: 635.9 mL      BNP  RADIOLOGY & ADDITIONAL STUDIES:  < from: CT Head No Cont (19 @ 18:17) >  IMPRESSION:     No acute intracranial bleeding, mass effect, or shift. Mild chronic   microvascular ischemic changes.                 NOEMI PEREIRA   This document has been electronically signed. 2019  6:26PM    < end of copied text >  < from: Transthoracic Echocardiogram (19 @ 12:02) >   Summary     The left ventricle cavity is mildly dilated. Left ventricle systolic   function appears severely impaired globally along with segmental wall   motion abnormalities noted. Severe hypokinesis of the inferior,   inferoseptum, anteroseptum and anterior walls. The apical cap is   hypokinetic and free of thrombus. Estimated ejection fraction is 18% via   bi-plane method.   The left atrium is moderately dilated.   The right atrium appears borderline dilated.   The right ventricle is borderline dilated with mildly impaired function.   The aortic valve is well visualized, appears mildly sclerotic. Aortic   valve leaflet excursion is preserved.   Trace aortic regurgitation is present.   The mitral valve leaflets are well seen thin and pliable; preserved   leaflet excursion noted. Trace to mild mitral valve regurgitation noted.   Redundant chordae noted.   EA reversal of the mitral inflow consistent with reduced compliance of   the   left ventricle (When appreciable.)   The tricuspid valve leaflets are well seen and appear thin and pliable   with preserved leaflets excursion. Trace tricuspid regurgitation noted.   The pulmonic valve leaflets appear thin and pliable. Trace to mild   pulmonic regurgitation noted.   No evidence of pericardial effusion.   No evidence of pleural effusion.     Signature     ----------------------------------------------------------------   Electronically signed by Mark Dueñas MD(Interpreting   physician) on 2019 05:48 PM   ----------------------------------------------------------------    < end of copied text >  < from: MR Head No Cont (19 @ 10:16) >  IMPRESSION:    small subcentimeter foci of restricted diffusion in the   RIGHT insular cortex and RIGHT parietal cortex stent with acute   infarctions. Bilateral mastoiditis is noted.                ELSY HATCH M.D., ATTENDING RADIOLOGIST  This document has been electronically signed. 2019 10:42AM    < end of copied text >

## 2019-02-06 NOTE — PROGRESS NOTE ADULT - PROBLEM SELECTOR PLAN 1
CHF education done with patient   Educational handouts provided including:  Signs and symptoms of CHF exacerbation   Daily Weights  What to do if symptoms worsen  How, when, and why to take medication  lifestyle changes  limiting sodium intake to 1500mg-2000mg daily  when to contact HCP  Ejection Fraction 18%      Post d/c follow up appointment to be made by unit clerk when medically stable

## 2019-02-06 NOTE — PROGRESS NOTE ADULT - ASSESSMENT
Assessment and Plan:    1. 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 Afib: not controlled  - changed to metoprolol 150 bid  - digoxin IV given  - anticoagulation with Eliquis   - cardioversion on thursday    3. New onset systolic CHF EF 18%  - hold on ACEI as patient with KRISTYN and needs more adjustment with meds for rate control   - continue statin, beta blockers  - may need cardiology follow up as outpatient to see EF improves, if not will need ICD   - EP eval apprecaited no AICD    4. Erythrocytosis: hydrate po  - monitor cbc, hemeonc w/u as outpt    5. DC bactrim    outpt cardio, neuro, PMD follow up

## 2019-02-07 LAB
ADD ON TEST-SPECIMEN IN LAB: SIGNIFICANT CHANGE UP
ALBUMIN SERPL ELPH-MCNC: 3.6 G/DL — SIGNIFICANT CHANGE UP (ref 3.3–5)
ALP SERPL-CCNC: 91 U/L — SIGNIFICANT CHANGE UP (ref 40–120)
ALT FLD-CCNC: 110 U/L — HIGH (ref 12–78)
ANION GAP SERPL CALC-SCNC: 8 MMOL/L — SIGNIFICANT CHANGE UP (ref 5–17)
AST SERPL-CCNC: 48 U/L — HIGH (ref 15–37)
BILIRUB SERPL-MCNC: 1.4 MG/DL — HIGH (ref 0.2–1.2)
BUN SERPL-MCNC: 21 MG/DL — SIGNIFICANT CHANGE UP (ref 7–23)
CALCIUM SERPL-MCNC: 9.1 MG/DL — SIGNIFICANT CHANGE UP (ref 8.5–10.1)
CHLORIDE SERPL-SCNC: 108 MMOL/L — SIGNIFICANT CHANGE UP (ref 96–108)
CO2 SERPL-SCNC: 26 MMOL/L — SIGNIFICANT CHANGE UP (ref 22–31)
CREAT SERPL-MCNC: 1.41 MG/DL — HIGH (ref 0.5–1.3)
GLUCOSE SERPL-MCNC: 88 MG/DL — SIGNIFICANT CHANGE UP (ref 70–99)
HCT VFR BLD CALC: 54.4 % — HIGH (ref 39–50)
HGB BLD-MCNC: 18.4 G/DL — HIGH (ref 13–17)
MCHC RBC-ENTMCNC: 29.1 PG — SIGNIFICANT CHANGE UP (ref 27–34)
MCHC RBC-ENTMCNC: 33.8 GM/DL — SIGNIFICANT CHANGE UP (ref 32–36)
MCV RBC AUTO: 85.9 FL — SIGNIFICANT CHANGE UP (ref 80–100)
NRBC # BLD: 0 /100 WBCS — SIGNIFICANT CHANGE UP (ref 0–0)
NT-PROBNP SERPL-SCNC: 1497 PG/ML — HIGH (ref 0–125)
PLATELET # BLD AUTO: 171 K/UL — SIGNIFICANT CHANGE UP (ref 150–400)
POTASSIUM SERPL-MCNC: 4.3 MMOL/L — SIGNIFICANT CHANGE UP (ref 3.5–5.3)
POTASSIUM SERPL-SCNC: 4.3 MMOL/L — SIGNIFICANT CHANGE UP (ref 3.5–5.3)
PROT SERPL-MCNC: 7 GM/DL — SIGNIFICANT CHANGE UP (ref 6–8.3)
RBC # BLD: 6.33 M/UL — HIGH (ref 4.2–5.8)
RBC # FLD: 12.6 % — SIGNIFICANT CHANGE UP (ref 10.3–14.5)
SODIUM SERPL-SCNC: 142 MMOL/L — SIGNIFICANT CHANGE UP (ref 135–145)
WBC # BLD: 5.3 K/UL — SIGNIFICANT CHANGE UP (ref 3.8–10.5)
WBC # FLD AUTO: 5.3 K/UL — SIGNIFICANT CHANGE UP (ref 3.8–10.5)

## 2019-02-07 PROCEDURE — 93010 ELECTROCARDIOGRAM REPORT: CPT

## 2019-02-07 RX ORDER — ALPRAZOLAM 0.25 MG
0.25 TABLET ORAL EVERY 12 HOURS
Qty: 0 | Refills: 0 | Status: DISCONTINUED | OUTPATIENT
Start: 2019-02-07 | End: 2019-02-09

## 2019-02-07 RX ORDER — BENZOCAINE AND MENTHOL 5; 1 G/100ML; G/100ML
1 LIQUID ORAL EVERY 4 HOURS
Qty: 0 | Refills: 0 | Status: DISCONTINUED | OUTPATIENT
Start: 2019-02-07 | End: 2019-02-09

## 2019-02-07 RX ADMIN — Medication 10 MILLIGRAM(S): at 13:04

## 2019-02-07 RX ADMIN — Medication 25 MICROGRAM(S): at 05:17

## 2019-02-07 RX ADMIN — Medication 150 MILLIGRAM(S): at 17:13

## 2019-02-07 RX ADMIN — Medication 81 MILLIGRAM(S): at 13:04

## 2019-02-07 RX ADMIN — Medication 0.12 MILLIGRAM(S): at 05:17

## 2019-02-07 RX ADMIN — Medication 0.25 MILLIGRAM(S): at 21:32

## 2019-02-07 RX ADMIN — ATORVASTATIN CALCIUM 40 MILLIGRAM(S): 80 TABLET, FILM COATED ORAL at 21:33

## 2019-02-07 RX ADMIN — APIXABAN 5 MILLIGRAM(S): 2.5 TABLET, FILM COATED ORAL at 17:13

## 2019-02-07 RX ADMIN — Medication 150 MILLIGRAM(S): at 05:17

## 2019-02-07 RX ADMIN — TAMSULOSIN HYDROCHLORIDE 0.4 MILLIGRAM(S): 0.4 CAPSULE ORAL at 21:33

## 2019-02-07 RX ADMIN — BENZOCAINE AND MENTHOL 1 LOZENGE: 5; 1 LIQUID ORAL at 17:13

## 2019-02-07 RX ADMIN — APIXABAN 5 MILLIGRAM(S): 2.5 TABLET, FILM COATED ORAL at 05:17

## 2019-02-07 RX ADMIN — Medication 1 TABLET(S): at 05:17

## 2019-02-07 NOTE — PROCEDURAL SAFETY CHECKLIST WITH OR WITHOUT SEDATION - NSPOSTCOMMENTFT_GEN_ALL_CORE
Pt. given viscous lidocaine, swish and swallow, by Dr. Brunson at 0756; Pt. given viscous lidocaine, swish and swallow, by Dr. Brunson at 0756; probe inserted by Dr. Brunson w/ assistance by Dr. Gonzalez at 0822 after multiple attempts; bubble study completed at 0824; repeat bubble study completed at 0828; probe removed at 0832; jailene. well. Pt. given viscous lidocaine, swish and swallow, by Dr. Brunson at 0756; probe inserted by Dr. Brunson w/ assistance by Dr. Gonzalez at 0822 after multiple attempts; bubble study completed at 0824; repeat bubble study completed at 0828; probe removed at 0834; jailene. well.

## 2019-02-07 NOTE — PROGRESS NOTE ADULT - ASSESSMENT
Assessment and Plan:    1. 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 Afib: not controlled  - changed to metoprolol 150 bid  - digoxin IV given  - anticoagulation with Eliquis   - cardioversion on thursday    3. New onset systolic CHF EF 18%  - hold on ACEI as patient with KRISTYN and needs more adjustment with meds for rate control   - continue statin, beta blockers  - may need cardiology follow up as outpatient to see EF improves, if not will need ICD   - EP eval apprecaited no AICD    4. Erythrocytosis: hydrate po  - monitor cbc, hemeonc w/u as outpt    5. DC bactrim    6. Left Atrial thrombus on MIHIR today: continue eliquis  - Unable to cardiovert today    7. Anxiety: Pt anxious now, xanax prn    outpt cardio, neuro, PMD follow up  Patient is unable to exert/ambulate due to tachycardia. MIHIR with thrombus today. F/u cardio for planning

## 2019-02-07 NOTE — PROGRESS NOTE ADULT - SUBJECTIVE AND OBJECTIVE BOX
HOSPITALIST ATTENDING PROGRESS NOTE    Chart and meds reviewed.  Patient seen and examined.    HPI: 62 yo male presents to the ED  with complain of episode of AMS today. Patient was at a party today when he suddenly began having slurred speech, facial droop, and diaphoresis. he also had +urinary incontinence. Episode lasted approximately 5 minutes, and then symptoms completely resolved. No LOC. Then he started to have SOB. He still felt SOB during his presentation in ED. As per patient, he has been "not feeling well" for a month, with palpitations and SOB described as "throat tightness." Patient was seen by PMD, was prescribed Augmentin for possible sinus infection, but with no relief of SOB. Patient has been taking Tylenol PM for symptoms and for sleep aid. Patient also has a cyst on his abd, for which he is on abx, doxycycline. Denies tobacco use, illicit drug use. Pt drank 1.5 glasses of wine at party today. Patient has been on a diet to lose weight, has decreased his portion sizes and decreased sugary and fatty foods in his diet, does not eat red meat. Exercises 3-4 days per week.    1/30/19 pt seen and examined, cath postponed due to being on eliquis. received AM dose, DC eliquis, start heparin gtt in evening, stop prior to cath.    1/31/19 awaiting cath today, HR uncontrolled with walking especially.     2/1/19 pt seen and examined, cath yesterday with non ischemic CAD, RHC with no elevated pressures, needs rate control.     2/2/19 feels well, rate not controlled, especially with ambulation, meds changed by cardio, digoxin today IV, changed to toprol    2/3/19 rate not well controlled, EP eval pending    2/4/19 pt seen and examined, rate still high with ambulation. d/w     2/5/19 pt seen and examined, for cardioversion on thursday. asked to hydrate    2/6/19 pt seen and examined, for MIHIR and cardioversion tomm.      All 10 systems reviewed and found to be negative with the exception of what has been described above.    MEDICATIONS  (STANDING):  apixaban 5 milliGRAM(s) Oral every 12 hours  aspirin  chewable 81 milliGRAM(s) Oral daily  atorvastatin 40 milliGRAM(s) Oral at bedtime  digoxin     Tablet 0.125 milliGRAM(s) Oral daily  influenza   Vaccine 0.5 milliLiter(s) IntraMuscular once  levothyroxine 25 MICROGram(s) Oral daily  metoprolol succinate  milliGRAM(s) Oral two times a day  oxybutynin 10 milliGRAM(s) Oral daily  tamsulosin 0.4 milliGRAM(s) Oral at bedtime    MEDICATIONS  (PRN):  ALPRAZolam 0.25 milliGRAM(s) Oral every 12 hours PRN anxiety  benzocaine 15 mG/menthol 3.6 mG Lozenge 1 Lozenge Oral every 4 hours PRN Sore Throat  metoprolol tartrate Injectable 5 milliGRAM(s) IV Push every 6 hours PRN HR >130      VITALS:  T(F): 98 (02-07-19 @ 10:29), Max: 98 (02-07-19 @ 10:29)  HR: 72 (02-07-19 @ 16:27) (49 - 109)  BP: 111/66 (02-07-19 @ 16:27) (90/58 - 111/66)  RR: 19 (02-07-19 @ 16:27) (16 - 19)  SpO2: 96% (02-07-19 @ 16:27) (94% - 100%)  Wt(kg): --    I&O's Summary      CAPILLARY BLOOD GLUCOSE          PHYSICAL EXAM:    HEENT:  pupils equal and reactive, EOMI, no oropharyngeal lesions, erythema, exudates, oral thrush    NECK:   supple, no carotid bruits, no palpable lymph nodes, no thyromegaly    CV:  +S1, +S2, regular, no murmurs or rubs    RESP:   lungs clear to auscultation bilaterally, no wheezing, rales, rhonchi, good air entry bilaterally    BREAST:  not examined    GI:  abdomen soft, non-tender, non-distended, normal BS, no bruits, no abdominal masses, no palpable masses    RECTAL:  not examined    :  not examined    MSK:   normal muscle tone, no atrophy, no rigidity, no contractions    EXT:   no clubbing, no cyanosis, no edema, no calf pain, swelling or erythema    VASCULAR:  pulses equal and symmetric in the upper and lower extremities    NEURO:  AAOX3, no focal neurological deficits, follows all commands, able to move extremities spontaneously    SKIN:  no ulcers, lesions or rashes    LABS:                            18.4   5.30  )-----------( 171      ( 07 Feb 2019 05:59 )             54.4     02-07    142  |  108  |  21  ----------------------------<  88  4.3   |  26  |  1.41<H>    Ca    9.1      07 Feb 2019 05:59    TPro  7.0  /  Alb  3.6  /  TBili  1.4<H>  /  DBili  x   /  AST  48<H>  /  ALT  110<H>  /  AlkPhos  91  02-07        LIVER FUNCTIONS - ( 07 Feb 2019 05:59 )  Alb: 3.6 g/dL / Pro: 7.0 gm/dL / ALK PHOS: 91 U/L / ALT: 110 U/L / AST: 48 U/L / GGT: x                                             CULTURES:

## 2019-02-07 NOTE — PACU DISCHARGE NOTE - COMMENTS
Report given to georgette Comer RN on [facility].   Pt. replaced on portable cardiac telemetry monitor and reading confirmed wally Jacobson RN on [facility].  Pt. transferred to inpt. room on [facility], 349, on cardiac monitor via stretcher accompanied by transport tech.

## 2019-02-07 NOTE — PHARMACOTHERAPY INTERVENTION NOTE - COMMENTS
Med rec complete - obtained from Dr. First
Patient provided education on eliquis - reviewed indication, side effects, dose.  Patient aware of co-pay $110 1st month, then $60 after that and is agreeable.  Manufacturers coupon provided to patient
recommended to add statin for CVA
recommended to d/c bactrim as course completed

## 2019-02-07 NOTE — PROGRESS NOTE ADULT - SUBJECTIVE AND OBJECTIVE BOX
Patient is a 63y old  Male who presents with a chief complaint of complain of slurry speech and altered mental status.       HPI:  62 yo male presents to the ED  with complain of episode of AMS today. Patient was at a party today when he suddenly began having slurred speech, facial droop, and diaphoresis. he also had +urinary incontinence. Episode lasted approximately 5 minutes, and then symptoms completely resolved. No LOC.  During the hosptial course he was noted to have afib wtih RVR and was started on cardizem drip.  He was also started on heparin drip for anticoagulation    - pt seen and examined by me today. Pt denies any new symptoms today.   - pt seen and examined by me today. c/o anxiety    - pt seen and examined by me today. Pt denies any CP or SOB.   - pt seen and examined by me today. Pt denies any CP or SOB.    /- pt seen and examined by me today. denies any symptoms.     /3- pt seen and examined by me today. Denies any symptoms.     /- pt seen and examined by me today. He still has afib with tachycardia.     /- pt seen and examined by me today. He denies any SOB.     /- pt seen and examined by me today before MIHIR.   He denies any symptoms.    Family Hx:  Mother: Alive, 91 yr, had colon cancer, DM, Aortic valve replacement  Father:  from Stomach cancer    PMHx:  BPH  Hypothyroidism     PSHx:  No recent surgeries. (2019 00:15)      PAST MEDICAL & SURGICAL HISTORY:  HLD (hyperlipidemia)  Hypercholesterolemia: controlled by diet and exercise  HTN - Hypertension: borderline controlled by diet/  exercise  Left Ear Excision of Cholesteotoma:   Left Inguinal Hernia Repair: @ 8 years old      MEDICATIONS  (STANDING):  diazepam    Tablet 2 milliGRAM(s) Oral once  diltiazem    Tablet 30 milliGRAM(s) Oral every 6 hours  diltiazem Infusion 10 mG/Hr (10 mL/Hr) IV Continuous <Continuous>  doxycycline hyclate Capsule 100 milliGRAM(s) Oral every 12 hours  heparin  Infusion.  Unit(s)/Hr (20 mL/Hr) IV Continuous <Continuous>  influenza   Vaccine 0.5 milliLiter(s) IntraMuscular once  levothyroxine 25 MICROGram(s) Oral daily  metoprolol tartrate 25 milliGRAM(s) Oral two times a day  oxybutynin 10 milliGRAM(s) Oral daily  tamsulosin 0.4 milliGRAM(s) Oral at bedtime    MEDICATIONS  (PRN):  heparin  Injectable 9000 Unit(s) IV Push every 6 hours PRN For aPTT less than 40  heparin  Injectable 4500 Unit(s) IV Push every 6 hours PRN For aPTT between 40 - 57      FAMILY HISTORY:      SOCIAL HISTORY:   ex smoker    REVIEW OF SYSTEMS:  CONSTITUTIONAL:    No fatigue, malaise, lethargy.  No fever or chills.  HEENT:  Eyes:  No visual changes.     ENT:  No epistaxis.  No sinus pain.    RESPIRATORY:  No cough.  No wheeze.  No hemoptysis.  No shortness of breath.  CARDIOVASCULAR:  No chest pains.  No palpitations. No shortness of breath, No orthopnea or PND.  GASTROINTESTINAL:  No abdominal pain.  No nausea or vomiting.    GENITOURINARY:    No hematuria.    MUSCULOSKELETAL:  No musculoskeletal pain.  No joint swelling.  No arthritis.  NEUROLOGICAL:  No tingling or numbness or weakness.  PSYCHIATRIC:  No confusion  SKIN:  No rashes.    ENDOCRINE:  No unexplained weight loss.  No polydipsia.   HEMATOLOGIC:  No anemia.  No prolonged or excessive bleeding.   ALLERGIC AND IMMUNOLOGIC:  No pruritus.          Vital Signs Last 24 Hrs  T(C): 36.7 (2019 04:36), Max: 36.8 (2019 17:14)  T(F): 98 (2019 04:36), Max: 98.2 (2019 17:14)  HR: 72 (2019 04:36) (72 - 117)  BP: 108/70 (2019 04:36) (106/59 - 121/73)  BP(mean): --  RR: 18 (2019 17:14) (18 - 18)  SpO2: 98% (2019 04:36) (96% - 99%)    PHYSICAL EXAM-    Constitutional: no acute distress    Head: Head is normocephalic and atraumatic.      Neck:  There are strong carotid pulses bilaterally. No JVD.     Cardiovascular: irregular rate and tachycardic.      Respiratory: Breathsounds are normal. No rales. No wheezing.    Abdomen: Soft, nontender, nondistended with positive bowel sounds.      Extremity: No tenderness. No  pitting edema     Neurologic: The patient is alert and oriented.      Skin: No rash, no obvious lesions noted.      Psychiatric: The patient appears to be emotionally stable.      INTERPRETATION OF TELEMETRY: afib 100/min    ECG:     I&O's Detail    2019 07:01  -  2019 07:00  --------------------------------------------------------  IN:    diltiazem Infusion: 248 mL    heparin  Infusion.: 387.9 mL  Total IN: 635.9 mL    OUT:  Total OUT: 0 mL    Total NET: 635.9 mL          LABS:                        15.5   6.55  )-----------( 155      ( 2019 06:55 )             46.6         145  |  117<H>  |  13  ----------------------------<  97  4.1   |  21<L>  |  1.25    Ca    8.8      2019 09:22  Mg     1.9         TPro  6.4  /  Alb  3.4  /  TBili  1.8<H>  /  DBili  x   /  AST  43<H>  /  ALT  126<H>  /  AlkPhos  68      CARDIAC MARKERS ( 2019 09:22 )  0.040 ng/mL / x     / x     / x     / x      CARDIAC MARKERS ( 2019 17:53 )  0.029 ng/mL / x     / x     / x     / x          PT/INR - ( 2019 17:53 )   PT: 16.5 sec;   INR: 1.47 ratio         PTT - ( 2019 06:56 )  PTT:84.3 sec  Urinalysis Basic - ( 2019 19:30 )    Color: Yellow / Appearance: Clear / S.025 / pH: x  Gluc: x / Ketone: Negative  / Bili: Negative / Urobili: Negative mg/dL   Blood: x / Protein: 15 mg/dL / Nitrite: Negative   Leuk Esterase: Trace / RBC: Negative /HPF / WBC 3-5   Sq Epi: x / Non Sq Epi: Occasional / Bacteria: Negative      I&O's Summary    2019 07:01  -  2019 07:00  --------------------------------------------------------  IN: 635.9 mL / OUT: 0 mL / NET: 635.9 mL      BNP  RADIOLOGY & ADDITIONAL STUDIES:  < from: CT Head No Cont (19 @ 18:17) >  IMPRESSION:     No acute intracranial bleeding, mass effect, or shift. Mild chronic   microvascular ischemic changes.                 NOEMI PEREIRA   This document has been electronically signed. 2019  6:26PM    < end of copied text >  < from: Transthoracic Echocardiogram (19 @ 12:02) >   Summary     The left ventricle cavity is mildly dilated. Left ventricle systolic   function appears severely impaired globally along with segmental wall   motion abnormalities noted. Severe hypokinesis of the inferior,   inferoseptum, anteroseptum and anterior walls. The apical cap is   hypokinetic and free of thrombus. Estimated ejection fraction is 18% via   bi-plane method.   The left atrium is moderately dilated.   The right atrium appears borderline dilated.   The right ventricle is borderline dilated with mildly impaired function.   The aortic valve is well visualized, appears mildly sclerotic. Aortic   valve leaflet excursion is preserved.   Trace aortic regurgitation is present.   The mitral valve leaflets are well seen thin and pliable; preserved   leaflet excursion noted. Trace to mild mitral valve regurgitation noted.   Redundant chordae noted.   EA reversal of the mitral inflow consistent with reduced compliance of   the   left ventricle (When appreciable.)   The tricuspid valve leaflets are well seen and appear thin and pliable   with preserved leaflets excursion. Trace tricuspid regurgitation noted.   The pulmonic valve leaflets appear thin and pliable. Trace to mild   pulmonic regurgitation noted.   No evidence of pericardial effusion.   No evidence of pleural effusion.     Signature     ----------------------------------------------------------------   Electronically signed by Mark Dueñas MD(Interpreting   physician) on 2019 05:48 PM   ----------------------------------------------------------------    < end of copied text >  < from: MR Head No Cont (19 @ 10:16) >  IMPRESSION:    small subcentimeter foci of restricted diffusion in the   RIGHT insular cortex and RIGHT parietal cortex stent with acute   infarctions. Bilateral mastoiditis is noted.                ELSY HATCH M.D., ATTENDING RADIOLOGIST  This document has been electronically signed. 2019 10:42AM    < end of copied text >

## 2019-02-07 NOTE — PROGRESS NOTE ADULT - ASSESSMENT
1. Afib with RVR -poor rate control with ambulation.  Will continue current doses of Toprol which is at max dose and continue digoxin.  continue elliquis.  MIHIR showed PIETRO thrombus.  Will hold off chemical or external cardioversion for now.  Will monitor him today.  Pt depressed since he was unable to get the CV but explained to him and his wife the reasoning behind the cancellation of the CV secondary to the intracardiac thrombus and the risks.  they both expressed full understanding of this.     2. Slurred speech- secondary to CVA.   Continue ELliquis.         3. Newly diagnosed HFrEF- LVEF 18%. Dilated cardiomyopathy with wall motion abnormalities as stated above.  Non ischemic cardiomyopathy- LHC did not reveal any obstructive CAD.    GDMT for HFrEF as stated above.   WIll hold lisinopril to accommodate rate control agents.|  Appears euvolemic on exam.   Will monitor BP today.  he might have mild hepatic congestion.  Will give low dose lasix iv today.        HTN- meds as stated above.     Hyperlipdiemia- statin     Other medical issues- Management per primary team.  Thank you for allowing me to participate in the care of this patient. Please feel free to contact me with any questions.

## 2019-02-08 LAB
ALBUMIN SERPL ELPH-MCNC: 3.5 G/DL — SIGNIFICANT CHANGE UP (ref 3.3–5)
ALP SERPL-CCNC: 91 U/L — SIGNIFICANT CHANGE UP (ref 40–120)
ALT FLD-CCNC: 104 U/L — HIGH (ref 12–78)
ANION GAP SERPL CALC-SCNC: 7 MMOL/L — SIGNIFICANT CHANGE UP (ref 5–17)
AST SERPL-CCNC: 47 U/L — HIGH (ref 15–37)
BILIRUB SERPL-MCNC: 1.5 MG/DL — HIGH (ref 0.2–1.2)
BUN SERPL-MCNC: 22 MG/DL — SIGNIFICANT CHANGE UP (ref 7–23)
CALCIUM SERPL-MCNC: 8.9 MG/DL — SIGNIFICANT CHANGE UP (ref 8.5–10.1)
CHLORIDE SERPL-SCNC: 108 MMOL/L — SIGNIFICANT CHANGE UP (ref 96–108)
CO2 SERPL-SCNC: 25 MMOL/L — SIGNIFICANT CHANGE UP (ref 22–31)
COHGB MFR BLDV: 1.8 % — HIGH (ref 0–1.5)
CREAT SERPL-MCNC: 1.49 MG/DL — HIGH (ref 0.5–1.3)
GLUCOSE SERPL-MCNC: 90 MG/DL — SIGNIFICANT CHANGE UP (ref 70–99)
HCT VFR BLD CALC: 55 % — HIGH (ref 39–50)
HGB BLD CALC-MCNC: 18.6 G/DL — HIGH (ref 13–17)
HGB BLD-MCNC: 18.4 G/DL — HIGH (ref 13–17)
MCHC RBC-ENTMCNC: 28.9 PG — SIGNIFICANT CHANGE UP (ref 27–34)
MCHC RBC-ENTMCNC: 33.5 GM/DL — SIGNIFICANT CHANGE UP (ref 32–36)
MCV RBC AUTO: 86.5 FL — SIGNIFICANT CHANGE UP (ref 80–100)
NRBC # BLD: 0 /100 WBCS — SIGNIFICANT CHANGE UP (ref 0–0)
PLATELET # BLD AUTO: 154 K/UL — SIGNIFICANT CHANGE UP (ref 150–400)
POTASSIUM SERPL-MCNC: 4.3 MMOL/L — SIGNIFICANT CHANGE UP (ref 3.5–5.3)
POTASSIUM SERPL-SCNC: 4.3 MMOL/L — SIGNIFICANT CHANGE UP (ref 3.5–5.3)
PROT SERPL-MCNC: 6.8 GM/DL — SIGNIFICANT CHANGE UP (ref 6–8.3)
RBC # BLD: 6.36 M/UL — HIGH (ref 4.2–5.8)
RBC # FLD: 13 % — SIGNIFICANT CHANGE UP (ref 10.3–14.5)
SODIUM SERPL-SCNC: 140 MMOL/L — SIGNIFICANT CHANGE UP (ref 135–145)
WBC # BLD: 5.9 K/UL — SIGNIFICANT CHANGE UP (ref 3.8–10.5)
WBC # FLD AUTO: 5.9 K/UL — SIGNIFICANT CHANGE UP (ref 3.8–10.5)

## 2019-02-08 RX ORDER — DIGOXIN 250 MCG
1 TABLET ORAL
Qty: 30 | Refills: 0
Start: 2019-02-08

## 2019-02-08 RX ORDER — DIGOXIN 250 MCG
0.25 TABLET ORAL DAILY
Qty: 0 | Refills: 0 | Status: DISCONTINUED | OUTPATIENT
Start: 2019-02-08 | End: 2019-02-09

## 2019-02-08 RX ORDER — FUROSEMIDE 40 MG
40 TABLET ORAL ONCE
Qty: 0 | Refills: 0 | Status: COMPLETED | OUTPATIENT
Start: 2019-02-08 | End: 2019-02-08

## 2019-02-08 RX ADMIN — ATORVASTATIN CALCIUM 40 MILLIGRAM(S): 80 TABLET, FILM COATED ORAL at 21:50

## 2019-02-08 RX ADMIN — Medication 0.25 MILLIGRAM(S): at 17:50

## 2019-02-08 RX ADMIN — Medication 40 MILLIGRAM(S): at 08:07

## 2019-02-08 RX ADMIN — TAMSULOSIN HYDROCHLORIDE 0.4 MILLIGRAM(S): 0.4 CAPSULE ORAL at 21:50

## 2019-02-08 RX ADMIN — Medication 0.12 MILLIGRAM(S): at 05:07

## 2019-02-08 RX ADMIN — APIXABAN 5 MILLIGRAM(S): 2.5 TABLET, FILM COATED ORAL at 05:07

## 2019-02-08 RX ADMIN — Medication 81 MILLIGRAM(S): at 12:26

## 2019-02-08 RX ADMIN — Medication 150 MILLIGRAM(S): at 17:46

## 2019-02-08 RX ADMIN — BENZOCAINE AND MENTHOL 1 LOZENGE: 5; 1 LIQUID ORAL at 05:14

## 2019-02-08 RX ADMIN — Medication 10 MILLIGRAM(S): at 12:27

## 2019-02-08 RX ADMIN — APIXABAN 5 MILLIGRAM(S): 2.5 TABLET, FILM COATED ORAL at 17:45

## 2019-02-08 RX ADMIN — Medication 25 MICROGRAM(S): at 05:07

## 2019-02-08 RX ADMIN — Medication 150 MILLIGRAM(S): at 05:07

## 2019-02-08 NOTE — PROGRESS NOTE ADULT - SUBJECTIVE AND OBJECTIVE BOX
Patient is a 63y old  Male who presents with a chief complaint of complain of slurry speech and altered mental status.       HPI:  64 yo male presents to the ED  with complain of episode of AMS today. Patient was at a party today when he suddenly began having slurred speech, facial droop, and diaphoresis. he also had +urinary incontinence. Episode lasted approximately 5 minutes, and then symptoms completely resolved. No LOC.  During the hosptial course he was noted to have afib wtih RVR and was started on cardizem drip.  He was also started on heparin drip for anticoagulation    - pt seen and examined by me today. Pt denies any new symptoms today.   - pt seen and examined by me today. c/o anxiety    - pt seen and examined by me today. Pt denies any CP or SOB.   - pt seen and examined by me today. Pt denies any CP or SOB.    /- pt seen and examined by me today. denies any symptoms.     /3- pt seen and examined by me today. Denies any symptoms.     /- pt seen and examined by me today. He still has afib with tachycardia.     /- pt seen and examined by me today. He denies any SOB.     /- pt seen and examined by me today before MIHIR.   He denies any symptoms.    /- Pt seen and examined by me today.  he denies any CP or SOB.     Family Hx:  Mother: Alive, 91 yr, had colon cancer, DM, Aortic valve replacement  Father:  from Stomach cancer    PMHx:  BPH  Hypothyroidism     PSHx:  No recent surgeries. (2019 00:15)      PAST MEDICAL & SURGICAL HISTORY:  HLD (hyperlipidemia)  Hypercholesterolemia: controlled by diet and exercise  HTN - Hypertension: borderline controlled by diet/  exercise  Left Ear Excision of Cholesteotoma:   Left Inguinal Hernia Repair: @ 8 years old      MEDICATIONS  (STANDING):  diazepam    Tablet 2 milliGRAM(s) Oral once  diltiazem    Tablet 30 milliGRAM(s) Oral every 6 hours  diltiazem Infusion 10 mG/Hr (10 mL/Hr) IV Continuous <Continuous>  doxycycline hyclate Capsule 100 milliGRAM(s) Oral every 12 hours  heparin  Infusion.  Unit(s)/Hr (20 mL/Hr) IV Continuous <Continuous>  influenza   Vaccine 0.5 milliLiter(s) IntraMuscular once  levothyroxine 25 MICROGram(s) Oral daily  metoprolol tartrate 25 milliGRAM(s) Oral two times a day  oxybutynin 10 milliGRAM(s) Oral daily  tamsulosin 0.4 milliGRAM(s) Oral at bedtime    MEDICATIONS  (PRN):  heparin  Injectable 9000 Unit(s) IV Push every 6 hours PRN For aPTT less than 40  heparin  Injectable 4500 Unit(s) IV Push every 6 hours PRN For aPTT between 40 - 57      FAMILY HISTORY:      SOCIAL HISTORY:   ex smoker    REVIEW OF SYSTEMS:  CONSTITUTIONAL:    No fatigue, malaise, lethargy.  No fever or chills.  HEENT:  Eyes:  No visual changes.     ENT:  No epistaxis.  No sinus pain.    RESPIRATORY:  No cough.  No wheeze.  No hemoptysis.  No shortness of breath.  CARDIOVASCULAR:  No chest pains.  No palpitations. No shortness of breath, No orthopnea or PND.  GASTROINTESTINAL:  No abdominal pain.  No nausea or vomiting.    GENITOURINARY:    No hematuria.    MUSCULOSKELETAL:  No musculoskeletal pain.  No joint swelling.  No arthritis.  NEUROLOGICAL:  No tingling or numbness or weakness.  PSYCHIATRIC:  No confusion  SKIN:  No rashes.    ENDOCRINE:  No unexplained weight loss.  No polydipsia.   HEMATOLOGIC:  No anemia.  No prolonged or excessive bleeding.   ALLERGIC AND IMMUNOLOGIC:  No pruritus.          Vital Signs Last 24 Hrs  T(C): 36.7 (2019 04:36), Max: 36.8 (2019 17:14)  T(F): 98 (2019 04:36), Max: 98.2 (2019 17:14)  HR: 72 (2019 04:36) (72 - 117)  BP: 108/70 (2019 04:36) (106/59 - 121/73)  BP(mean): --  RR: 18 (2019 17:14) (18 - 18)  SpO2: 98% (2019 04:36) (96% - 99%)    PHYSICAL EXAM-    Constitutional: no acute distress    Head: Head is normocephalic and atraumatic.      Neck:  There are strong carotid pulses bilaterally. No JVD.     Cardiovascular: irregular rate and tachycardic.      Respiratory: Breathsounds are normal. No rales. No wheezing.    Abdomen: Soft, nontender, nondistended with positive bowel sounds.      Extremity: No tenderness. No  pitting edema     Neurologic: The patient is alert and oriented.      Skin: No rash, no obvious lesions noted.      Psychiatric: The patient appears to be emotionally stable.      INTERPRETATION OF TELEMETRY: afib 100/min    ECG:     I&O's Detail    2019 07:01  -  2019 07:00  --------------------------------------------------------  IN:    diltiazem Infusion: 248 mL    heparin  Infusion.: 387.9 mL  Total IN: 635.9 mL    OUT:  Total OUT: 0 mL    Total NET: 635.9 mL          LABS:                        15.5   6.55  )-----------( 155      ( 2019 06:55 )             46.6         145  |  117<H>  |  13  ----------------------------<  97  4.1   |  21<L>  |  1.25    Ca    8.8      2019 09:22  Mg     1.9         TPro  6.4  /  Alb  3.4  /  TBili  1.8<H>  /  DBili  x   /  AST  43<H>  /  ALT  126<H>  /  AlkPhos  68      CARDIAC MARKERS ( 2019 09:22 )  0.040 ng/mL / x     / x     / x     / x      CARDIAC MARKERS ( 2019 17:53 )  0.029 ng/mL / x     / x     / x     / x          PT/INR - ( 2019 17:53 )   PT: 16.5 sec;   INR: 1.47 ratio         PTT - ( 2019 06:56 )  PTT:84.3 sec  Urinalysis Basic - ( 2019 19:30 )    Color: Yellow / Appearance: Clear / S.025 / pH: x  Gluc: x / Ketone: Negative  / Bili: Negative / Urobili: Negative mg/dL   Blood: x / Protein: 15 mg/dL / Nitrite: Negative   Leuk Esterase: Trace / RBC: Negative /HPF / WBC 3-5   Sq Epi: x / Non Sq Epi: Occasional / Bacteria: Negative      I&O's Summary    2019 07:  -  2019 07:00  --------------------------------------------------------  IN: 635.9 mL / OUT: 0 mL / NET: 635.9 mL      BNP  RADIOLOGY & ADDITIONAL STUDIES:  < from: CT Head No Cont (19 @ 18:17) >  IMPRESSION:     No acute intracranial bleeding, mass effect, or shift. Mild chronic   microvascular ischemic changes.                 NOEMI PEREIRA   This document has been electronically signed. 2019  6:26PM    < end of copied text >  < from: Transthoracic Echocardiogram (19 @ 12:02) >   Summary     The left ventricle cavity is mildly dilated. Left ventricle systolic   function appears severely impaired globally along with segmental wall   motion abnormalities noted. Severe hypokinesis of the inferior,   inferoseptum, anteroseptum and anterior walls. The apical cap is   hypokinetic and free of thrombus. Estimated ejection fraction is 18% via   bi-plane method.   The left atrium is moderately dilated.   The right atrium appears borderline dilated.   The right ventricle is borderline dilated with mildly impaired function.   The aortic valve is well visualized, appears mildly sclerotic. Aortic   valve leaflet excursion is preserved.   Trace aortic regurgitation is present.   The mitral valve leaflets are well seen thin and pliable; preserved   leaflet excursion noted. Trace to mild mitral valve regurgitation noted.   Redundant chordae noted.   EA reversal of the mitral inflow consistent with reduced compliance of   the   left ventricle (When appreciable.)   The tricuspid valve leaflets are well seen and appear thin and pliable   with preserved leaflets excursion. Trace tricuspid regurgitation noted.   The pulmonic valve leaflets appear thin and pliable. Trace to mild   pulmonic regurgitation noted.   No evidence of pericardial effusion.   No evidence of pleural effusion.     Signature     ----------------------------------------------------------------   Electronically signed by Mark Dueñas MD(Interpreting   physician) on 2019 05:48 PM   ----------------------------------------------------------------    < end of copied text >  < from: MR Head No Cont (19 @ 10:16) >  IMPRESSION:    small subcentimeter foci of restricted diffusion in the   RIGHT insular cortex and RIGHT parietal cortex stent with acute   infarctions. Bilateral mastoiditis is noted.                ELSY HATCH M.D., ATTENDING RADIOLOGIST  This document has been electronically signed. 2019 10:42AM    < end of copied text >

## 2019-02-08 NOTE — PROGRESS NOTE ADULT - SUBJECTIVE AND OBJECTIVE BOX
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   Pt s/p MIHIR which revealed PIETRO thrombus. Pt is currently on Eliquis, as well as Toprol and Digoxin, however without rate control during ambulation.     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   Pt s/p MIHIR which revealed PIETRO thrombus. Pt is currently on Eliquis, as well as Toprol and Digoxin, however without rate control during ambulation.   Pt has no active cardiac complaints, admits to some constipation. Denies CP, SOB, LUNA, palpitations, lightheadedness, dizziness.     Current Rx: Eliquis 5 BID, BASA, Xanax prn, Benzocaine prn, Digoxin 0.125mg, Toprol 150 BID, Lipitor 40 QHs, Synthroid 25mcg, Oxybutynin 10mg, Flomax     VS: -111/56-74, P 49-73, RR 18-19, Afebrile, 100% sat on RA   GEN: NAD, sitting comfortably in bed  Neck: No JVD  CVD: Irregularly Irregular, No m/r/g noted  PULM: CTA B/L  EXT No c/c/e  NEURO: no focal deficits noted     Labs:  CBC 5.9/18.4/55.0/154  /4.3/108/25/22/1.49/90  Digoxin level 2/7 0.63    Imaging:   EKG 2/7: AFib @ 68bpm, LAD, PVC, TWI V4-V6  Telemetry: A Fib

## 2019-02-08 NOTE — PROGRESS NOTE ADULT - ASSESSMENT
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   Pt s/p MIHIR which revealed PIETRO thrombus. Pt is currently on Eliquis, as well as Toprol and Digoxin, however without rate control during ambulation. 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   Pt s/p MIHIR which revealed PIETRO thrombus. Pt is currently on Eliquis, as well as Toprol and Digoxin, still having episodes of RVR on telemetry, asymptomatic.    1. A Fib RVR  - No plan for cardioversion 2/2 PIETRO thrombus  - Continue with Eliquis for AC  - Continue with Toprol 150mg BID; Increase Digoxin to 0.25mg daily   - May discharge with HR < 110, with Outpatient EP followup

## 2019-02-08 NOTE — PROGRESS NOTE ADULT - ASSESSMENT
1. Afib with RVR -poor rate control with ambulation.  Will continue current doses of Toprol which is at max dose and continue digoxin.  continue elliquis.  MIHIR showed PIETRO thrombus.  Will hold off chemical or external cardioversion for now.  EP team followup recommendations  IF HR no more than 110/min then will consider discharge home today.      2. Slurred speech- secondary to CVA.   Continue ELliquis.         3. Newly diagnosed HFrEF- LVEF 18%. Dilated cardiomyopathy with wall motion abnormalities as stated above.  Non ischemic cardiomyopathy- LHC did not reveal any obstructive CAD.    GDMT for HFrEF as stated above.   WIll hold lisinopril to accommodate rate control agents.|  he might have mild hepatic congestion.  Will give low dose lasix iv today.        HTN- meds as stated above.     Hyperlipdiemia- statin     Other medical issues- Management per primary team.  Thank you for allowing me to participate in the care of this patient. Please feel free to contact me with any questions.

## 2019-02-09 VITALS — SYSTOLIC BLOOD PRESSURE: 108 MMHG | HEART RATE: 73 BPM | DIASTOLIC BLOOD PRESSURE: 65 MMHG

## 2019-02-09 LAB
EPO SERPL-MCNC: 6.3 MIU/ML — SIGNIFICANT CHANGE UP (ref 2.6–18.5)
HCT VFR BLD CALC: 55.2 % — HIGH (ref 39–50)
HGB BLD-MCNC: 18.7 G/DL — HIGH (ref 13–17)
MCHC RBC-ENTMCNC: 28.9 PG — SIGNIFICANT CHANGE UP (ref 27–34)
MCHC RBC-ENTMCNC: 33.9 GM/DL — SIGNIFICANT CHANGE UP (ref 32–36)
MCV RBC AUTO: 85.3 FL — SIGNIFICANT CHANGE UP (ref 80–100)
NRBC # BLD: 0 /100 WBCS — SIGNIFICANT CHANGE UP (ref 0–0)
PLATELET # BLD AUTO: 156 K/UL — SIGNIFICANT CHANGE UP (ref 150–400)
RBC # BLD: 6.47 M/UL — HIGH (ref 4.2–5.8)
RBC # FLD: 12.9 % — SIGNIFICANT CHANGE UP (ref 10.3–14.5)
WBC # BLD: 6.22 K/UL — SIGNIFICANT CHANGE UP (ref 3.8–10.5)
WBC # FLD AUTO: 6.22 K/UL — SIGNIFICANT CHANGE UP (ref 3.8–10.5)

## 2019-02-09 RX ORDER — ALPRAZOLAM 0.25 MG
1 TABLET ORAL
Qty: 60 | Refills: 0
Start: 2019-02-09

## 2019-02-09 RX ORDER — METOPROLOL TARTRATE 50 MG
1 TABLET ORAL
Qty: 60 | Refills: 0
Start: 2019-02-09

## 2019-02-09 RX ORDER — METOPROLOL TARTRATE 50 MG
50 TABLET ORAL ONCE
Qty: 0 | Refills: 0 | Status: COMPLETED | OUTPATIENT
Start: 2019-02-09 | End: 2019-02-09

## 2019-02-09 RX ADMIN — Medication 0.25 MILLIGRAM(S): at 05:32

## 2019-02-09 RX ADMIN — APIXABAN 5 MILLIGRAM(S): 2.5 TABLET, FILM COATED ORAL at 05:32

## 2019-02-09 RX ADMIN — Medication 25 MICROGRAM(S): at 05:32

## 2019-02-09 RX ADMIN — Medication 81 MILLIGRAM(S): at 11:31

## 2019-02-09 RX ADMIN — Medication 10 MILLIGRAM(S): at 11:31

## 2019-02-09 RX ADMIN — Medication 50 MILLIGRAM(S): at 12:07

## 2019-02-09 RX ADMIN — Medication 150 MILLIGRAM(S): at 05:32

## 2019-02-09 NOTE — PROGRESS NOTE ADULT - PROVIDER SPECIALTY LIST ADULT
Cardiology
Electrophysiology
Electrophysiology
Heart Failure
Hospitalist
Neurology
Hospitalist
Hospitalist
Cardiology

## 2019-02-09 NOTE — PROGRESS NOTE ADULT - SUBJECTIVE AND OBJECTIVE BOX
Patient is a 63y old  Male who presents with a chief complaint of complain of slurry speech and altered mental status (08 Feb 2019 09:09)    2/9- feels great but still with rapid HR with exertion    MEDICATIONS  (STANDING):  apixaban 5 milliGRAM(s) Oral every 12 hours  aspirin  chewable 81 milliGRAM(s) Oral daily  atorvastatin 40 milliGRAM(s) Oral at bedtime  digoxin     Tablet 0.25 milliGRAM(s) Oral daily  levothyroxine 25 MICROGram(s) Oral daily  metoprolol succinate  milliGRAM(s) Oral two times a day  oxybutynin 10 milliGRAM(s) Oral daily  tamsulosin 0.4 milliGRAM(s) Oral at bedtime    MEDICATIONS  (PRN):  ALPRAZolam 0.25 milliGRAM(s) Oral every 12 hours PRN anxiety  benzocaine 15 mG/menthol 3.6 mG Lozenge 1 Lozenge Oral every 4 hours PRN Sore Throat  metoprolol tartrate Injectable 5 milliGRAM(s) IV Push every 6 hours PRN HR >130            Vital Signs Last 24 Hrs  T(C): 36.4 (09 Feb 2019 10:11), Max: 37.1 (09 Feb 2019 05:03)  T(F): 97.6 (09 Feb 2019 10:11), Max: 98.7 (09 Feb 2019 05:03)  HR: 74 (09 Feb 2019 10:11) (68 - 87)  BP: 99/58 (09 Feb 2019 10:11) (96/71 - 105/58)  BP(mean): --  RR: 18 (09 Feb 2019 10:11) (17 - 19)  SpO2: 100% (09 Feb 2019 10:11) (97% - 100%)            INTERPRETATION OF TELEMETRY: AFIB     ECG:        LABS:                        18.7   6.22  )-----------( 156      ( 09 Feb 2019 06:33 )             55.2     02-08    140  |  108  |  22  ----------------------------<  90  4.3   |  25  |  1.49<H>    Ca    8.9      08 Feb 2019 06:10    TPro  6.8  /  Alb  3.5  /  TBili  1.5<H>  /  DBili  x   /  AST  47<H>  /  ALT  104<H>  /  AlkPhos  91  02-08            I&O's Summary    BNP  RADIOLOGY & ADDITIONAL STUDIES:

## 2019-02-09 NOTE — PROGRESS NOTE ADULT - REASON FOR ADMISSION
complain of slurry speech and altered mental status

## 2019-02-09 NOTE — PROGRESS NOTE ADULT - ASSESSMENT
1. Afib with RVR -poor rate control with ambulation. will inc toprol to 200mg BID with DIG 0.25 daily can DC home today     continue elliquis.  MIHIR showed PIETRO thrombus.  Will hold off chemical or external cardioversion for now.        2. Slurred speech- secondary to CVA.   Continue ELliquis.         3. Newly diagnosed HFrEF- LVEF 18%. Dilated cardiomyopathy with wall motion abnormalities as stated above.  Non ischemic cardiomyopathy- LHC did not reveal any obstructive CAD.    GDMT for HFrEF as stated above.   WIll hold lisinopril to accommodate rate control agents.|  he might have mild hepatic congestion.  Will give low dose lasix iv today.        HTN- meds as stated above.     Hyperlipdiemia- statin     elevated Hb could be seondary to undiagnosed MOISE or PCV , need outpt Sleep study  He will f/u with EP and Dr Kendall as outpt if BP tolerates will start ACEI/ARB as oupt

## 2019-02-13 NOTE — CDI QUERY NOTE - NSCDIOTHERTXTBX_GEN_ALL_CORE_HH
This patient is documented with AKII and CKD III    DOCUMENTATION:      Creatinine Trend:  Creatinine, Serum: 1.49 mg/dL <H> [0.50 - 1.30] (02-08-19)  Creatinine, Serum: 1.41 mg/dL <H> [0.50 - 1.30] (02-07-19)  Creatinine, Serum: 1.46 mg/dL <H> [0.50 - 1.30] (02-06-19)  Creatinine, Serum: 1.43 mg/dL <H> [0.50 - 1.30] (02-04-19)  Creatinine, Serum: 1.45 mg/dL <H> [0.50 - 1.30] (02-03-19)  Creatinine, Serum: 1.42 mg/dL <H> [0.50 - 1.30] (02-02-19)  Creatinine, Serum: 1.41 mg/dL <H> [0.50 - 1.30] (02-01-19)  Creatinine, Serum: 1.44 mg/dL <H> [0.50 - 1.30] (01-31-19)  Creatinine, Serum: 1.25 mg/dL [0.50 - 1.30] (01-30-19)  Creatinine, Serum: 1.25 mg/dL [0.50 - 1.30] (01-27-19)  Creatinine, Serum: 1.46 mg/dL <H> [0.50 - 1.30] (01-26-19)         [facility] policy based on KDIGO guidelines defines KRISTYN (applicable to both adult and pediatric patients) as any of the following;  •	Increase Creatinine = 0.3 mg/dl from baseline within 48 hours; or  •	Increase in Creatinine level to = 1.5x baseline, which is known or presumed to have occurred within the prior 7 days; or      According to clinical guidelines, clinical criteria must support documented clinical diagnoses.    Can you please clarify the clinical indicators supporting the diagnosis of KRISTYN or clarify that KRISTYN has been ruled out?    Please document a baseline creatinine.

## 2019-02-14 PROBLEM — E78.5 HYPERLIPIDEMIA, UNSPECIFIED: Chronic | Status: ACTIVE | Noted: 2019-01-26

## 2019-02-20 DIAGNOSIS — R29.700 NIHSS SCORE 0: ICD-10-CM

## 2019-02-20 DIAGNOSIS — B43.2: ICD-10-CM

## 2019-02-20 DIAGNOSIS — Z79.82 LONG TERM (CURRENT) USE OF ASPIRIN: ICD-10-CM

## 2019-02-20 DIAGNOSIS — I51.3 INTRACARDIAC THROMBOSIS, NOT ELSEWHERE CLASSIFIED: ICD-10-CM

## 2019-02-20 DIAGNOSIS — E78.00 PURE HYPERCHOLESTEROLEMIA, UNSPECIFIED: ICD-10-CM

## 2019-02-20 DIAGNOSIS — I42.0 DILATED CARDIOMYOPATHY: ICD-10-CM

## 2019-02-20 DIAGNOSIS — D75.1 SECONDARY POLYCYTHEMIA: ICD-10-CM

## 2019-02-20 DIAGNOSIS — I48.91 UNSPECIFIED ATRIAL FIBRILLATION: ICD-10-CM

## 2019-02-20 DIAGNOSIS — I63.89 OTHER CEREBRAL INFARCTION: ICD-10-CM

## 2019-02-20 DIAGNOSIS — R41.82 ALTERED MENTAL STATUS, UNSPECIFIED: ICD-10-CM

## 2019-02-20 DIAGNOSIS — I48.92 UNSPECIFIED ATRIAL FLUTTER: ICD-10-CM

## 2019-02-20 DIAGNOSIS — I50.21 ACUTE SYSTOLIC (CONGESTIVE) HEART FAILURE: ICD-10-CM

## 2019-02-20 DIAGNOSIS — I25.10 ATHEROSCLEROTIC HEART DISEASE OF NATIVE CORONARY ARTERY WITHOUT ANGINA PECTORIS: ICD-10-CM

## 2019-02-20 DIAGNOSIS — N40.0 BENIGN PROSTATIC HYPERPLASIA WITHOUT LOWER URINARY TRACT SYMPTOMS: ICD-10-CM

## 2019-02-20 DIAGNOSIS — Z87.891 PERSONAL HISTORY OF NICOTINE DEPENDENCE: ICD-10-CM

## 2019-02-20 DIAGNOSIS — I34.0 NONRHEUMATIC MITRAL (VALVE) INSUFFICIENCY: ICD-10-CM

## 2019-02-20 DIAGNOSIS — F41.9 ANXIETY DISORDER, UNSPECIFIED: ICD-10-CM

## 2019-02-20 DIAGNOSIS — I11.0 HYPERTENSIVE HEART DISEASE WITH HEART FAILURE: ICD-10-CM

## 2019-02-20 DIAGNOSIS — R74.0 NONSPECIFIC ELEVATION OF LEVELS OF TRANSAMINASE AND LACTIC ACID DEHYDROGENASE [LDH]: ICD-10-CM

## 2019-02-20 DIAGNOSIS — E03.9 HYPOTHYROIDISM, UNSPECIFIED: ICD-10-CM

## 2019-02-21 ENCOUNTER — APPOINTMENT (OUTPATIENT)
Dept: MRI IMAGING | Facility: CLINIC | Age: 64
End: 2019-02-21

## 2019-05-15 ENCOUNTER — APPOINTMENT (OUTPATIENT)
Dept: OTOLARYNGOLOGY | Facility: CLINIC | Age: 64
End: 2019-05-15
Payer: COMMERCIAL

## 2019-05-15 VITALS
SYSTOLIC BLOOD PRESSURE: 105 MMHG | WEIGHT: 240 LBS | HEART RATE: 46 BPM | DIASTOLIC BLOOD PRESSURE: 68 MMHG | BODY MASS INDEX: 30.8 KG/M2 | HEIGHT: 74 IN

## 2019-05-15 DIAGNOSIS — H95.122: ICD-10-CM

## 2019-05-15 PROCEDURE — 69220 CLEAN OUT MASTOID CAVITY: CPT | Mod: LT

## 2019-05-15 PROCEDURE — 99213 OFFICE O/P EST LOW 20 MIN: CPT | Mod: 25

## 2019-05-15 RX ORDER — APIXABAN 5 MG/1
TABLET, FILM COATED ORAL
Refills: 0 | Status: ACTIVE | COMMUNITY

## 2019-05-15 RX ORDER — CIPROFLOXACIN AND DEXAMETHASONE 3; 1 MG/ML; MG/ML
0.3-0.1 SUSPENSION/ DROPS AURICULAR (OTIC) TWICE DAILY
Qty: 1 | Refills: 4 | Status: DISCONTINUED | COMMUNITY
Start: 2018-02-22 | End: 2019-05-15

## 2019-05-15 RX ORDER — ATORVASTATIN CALCIUM 80 MG/1
TABLET, FILM COATED ORAL
Refills: 0 | Status: ACTIVE | COMMUNITY

## 2019-05-15 RX ORDER — AMOXICILLIN AND CLAVULANATE POTASSIUM 875; 125 MG/1; MG/1
875-125 TABLET, COATED ORAL
Qty: 20 | Refills: 0 | Status: DISCONTINUED | COMMUNITY
Start: 2018-10-17 | End: 2019-05-15

## 2019-05-15 RX ORDER — ASPIRIN 81 MG
81 TABLET, DELAYED RELEASE (ENTERIC COATED) ORAL
Refills: 0 | Status: ACTIVE | COMMUNITY

## 2019-06-04 NOTE — PROCEDURE
[Same] : same as the Pre Op Dx. [] : Debridement of Mastoid [FreeTextEntry4] : none [FreeTextEntry1] : left CWD [FreeTextEntry6] : Canal wall down mastoid debrided under microscope and ear filled with tobradex ointment for flat granulation.  Patient tolerated procedure well.\par

## 2019-06-04 NOTE — PHYSICAL EXAM
[Midline] : trachea located in midline position [Normal] : no nystagmus [de-identified] : RIGHT TM NORMAL, LEFT CWD - flat granulation - filled tobradex ointment

## 2019-06-04 NOTE — HISTORY OF PRESENT ILLNESS
[de-identified] : 62 yo returns for routine left ear cleaning. Pt with h/o left CWD - here for routine cleaning. Pt had been diagnosed with 2 TIAs and A-fib - also has CHF was admitted to [facility] x 2 weeks- on Eliquis 5mg and Aspirin 81mg. Pt continues with left ear drainage- clear, same since last visit- no changes. \par \par Pt denies otalgia, ear infections, hearing loss, tinnitus, dizziness, vertigo or headaches related to hearing.

## 2019-06-12 RX ORDER — OXYBUTYNIN CHLORIDE 5 MG
1 TABLET ORAL
Qty: 0 | Refills: 0 | DISCHARGE

## 2019-06-12 RX ORDER — OXYBUTYNIN CHLORIDE 5 MG
0 TABLET ORAL
Qty: 0 | Refills: 0 | DISCHARGE

## 2019-06-12 NOTE — ASU PATIENT PROFILE, ADULT - PMH
HLD (hyperlipidemia)    HTN - Hypertension  borderline controlled by diet/  exercise  Hypercholesterolemia  controlled by diet and exercise

## 2019-06-12 NOTE — ASU PATIENT PROFILE, ADULT - REASON FOR ADMISSION, PROFILE
I have atrial fib and need to have a cardioversion done again because the atrial fib came back since my last cardioversion

## 2019-06-13 ENCOUNTER — OUTPATIENT (OUTPATIENT)
Dept: OUTPATIENT SERVICES | Facility: HOSPITAL | Age: 64
LOS: 1 days | Discharge: HOME | End: 2019-06-13
Payer: COMMERCIAL

## 2019-06-13 VITALS — WEIGHT: 225.09 LBS | HEIGHT: 74 IN

## 2019-06-13 VITALS
OXYGEN SATURATION: 99 % | HEART RATE: 80 BPM | SYSTOLIC BLOOD PRESSURE: 104 MMHG | RESPIRATION RATE: 18 BRPM | DIASTOLIC BLOOD PRESSURE: 84 MMHG

## 2019-06-13 LAB
HCT VFR BLD CALC: 48.5 % — SIGNIFICANT CHANGE UP (ref 39–50)
HGB BLD-MCNC: 16.5 G/DL — SIGNIFICANT CHANGE UP (ref 13–17)
MCHC RBC-ENTMCNC: 30 PG — SIGNIFICANT CHANGE UP (ref 27–34)
MCHC RBC-ENTMCNC: 34 GM/DL — SIGNIFICANT CHANGE UP (ref 32–36)
MCV RBC AUTO: 88.2 FL — SIGNIFICANT CHANGE UP (ref 80–100)
PLATELET # BLD AUTO: 175 K/UL — SIGNIFICANT CHANGE UP (ref 150–400)
RBC # BLD: 5.5 M/UL — SIGNIFICANT CHANGE UP (ref 4.2–5.8)
RBC # FLD: 13.2 % — SIGNIFICANT CHANGE UP (ref 10.3–14.5)
WBC # BLD: 7.6 K/UL — SIGNIFICANT CHANGE UP (ref 3.8–10.5)
WBC # FLD AUTO: 7.6 K/UL — SIGNIFICANT CHANGE UP (ref 3.8–10.5)

## 2019-06-13 PROCEDURE — 93010 ELECTROCARDIOGRAM REPORT: CPT

## 2019-06-13 RX ORDER — ATORVASTATIN CALCIUM 80 MG/1
1 TABLET, FILM COATED ORAL
Qty: 0 | Refills: 0 | DISCHARGE

## 2019-06-13 RX ORDER — DIGOXIN 250 MCG
1 TABLET ORAL
Qty: 0 | Refills: 0 | DISCHARGE

## 2019-06-13 RX ORDER — OXYBUTYNIN CHLORIDE 5 MG
0 TABLET ORAL
Qty: 0 | Refills: 0 | DISCHARGE

## 2019-06-13 RX ORDER — METOPROLOL TARTRATE 50 MG
1 TABLET ORAL
Qty: 0 | Refills: 0 | DISCHARGE

## 2019-06-13 RX ORDER — TAMSULOSIN HYDROCHLORIDE 0.4 MG/1
1 CAPSULE ORAL
Qty: 0 | Refills: 0 | DISCHARGE

## 2019-06-13 RX ORDER — LEVOTHYROXINE SODIUM 125 MCG
1 TABLET ORAL
Qty: 0 | Refills: 0 | DISCHARGE

## 2019-06-13 RX ORDER — FUROSEMIDE 40 MG
1 TABLET ORAL
Qty: 0 | Refills: 0 | DISCHARGE

## 2019-06-13 NOTE — PACU DISCHARGE NOTE - COMMENTS
Pt. and wife verb. agreement and understanding to and teach back to written and verbal discharge instructions.   Pt. discharged to home via private auto accompanied by wife; escorted to auto via w/c by transport tech.

## 2019-06-13 NOTE — PROCEDURAL SAFETY CHECKLIST WITH OR WITHOUT SEDATION - NSPOSTCOMMENTFT_GEN_ALL_CORE
Pt. given viscous lidocaine and hurricaine spray, swish and swallow, by DR. Kaba at 0832; probe inserted by Dr. Brunson without difficulty at 0841; Pt. given viscous lidocaine and hurricaine spray, swish and swallow, by DR. Kaba at 0832; probe inserted by Dr. Brunson without difficulty at 0841; study completed and probe removed at 0850; cardioversion aborted d/t presence of thrombus.

## 2019-06-14 DIAGNOSIS — I48.91 UNSPECIFIED ATRIAL FIBRILLATION: ICD-10-CM

## 2019-06-14 DIAGNOSIS — E78.5 HYPERLIPIDEMIA, UNSPECIFIED: ICD-10-CM

## 2019-06-14 DIAGNOSIS — E78.00 PURE HYPERCHOLESTEROLEMIA, UNSPECIFIED: ICD-10-CM

## 2019-06-14 DIAGNOSIS — I10 ESSENTIAL (PRIMARY) HYPERTENSION: ICD-10-CM

## 2019-11-13 ENCOUNTER — APPOINTMENT (OUTPATIENT)
Dept: OTOLARYNGOLOGY | Facility: CLINIC | Age: 64
End: 2019-11-13
Payer: COMMERCIAL

## 2019-11-13 PROCEDURE — 99213 OFFICE O/P EST LOW 20 MIN: CPT | Mod: 25

## 2019-11-13 PROCEDURE — G0268 REMOVAL OF IMPACTED WAX MD: CPT

## 2019-11-25 NOTE — HISTORY OF PRESENT ILLNESS
[de-identified] : 64M here for routine left ear cleaning. Pt with h/o left CWD. Pt denies otalgia, otorrhea, ear infections, tinnitus, dizziness, vertigo or headaches related to hearing. Pt states he feels his hearing is a little worse from last office visit. Not working since CVA - no longer has afib - still in elequis - no otorrhea - no complaints - no infecitons. No vertigo

## 2019-11-25 NOTE — PHYSICAL EXAM
[Normal] : mucosa is normal [Midline] : trachea located in midline position [de-identified] : RIGHT TM NORMAL, LEFT CWD - cerumen removed - no granulation

## 2019-11-25 NOTE — PHYSICAL EXAM
[Normal] : mucosa is normal [Midline] : trachea located in midline position [de-identified] : RIGHT TM NORMAL, LEFT CWD - cerumen removed - no granulation

## 2019-11-25 NOTE — HISTORY OF PRESENT ILLNESS
[de-identified] : 64M here for routine left ear cleaning. Pt with h/o left CWD. Pt denies otalgia, otorrhea, ear infections, tinnitus, dizziness, vertigo or headaches related to hearing. Pt states he feels his hearing is a little worse from last office visit. Not working since CVA - no longer has afib - still in elequis - no otorrhea - no complaints - no infecitons. No vertigo

## 2020-01-29 ENCOUNTER — APPOINTMENT (OUTPATIENT)
Dept: OTOLARYNGOLOGY | Facility: CLINIC | Age: 65
End: 2020-01-29
Payer: COMMERCIAL

## 2020-01-29 VITALS
SYSTOLIC BLOOD PRESSURE: 120 MMHG | WEIGHT: 225 LBS | BODY MASS INDEX: 28.88 KG/M2 | HEIGHT: 74 IN | HEART RATE: 62 BPM | DIASTOLIC BLOOD PRESSURE: 75 MMHG

## 2020-01-29 PROCEDURE — 31231 NASAL ENDOSCOPY DX: CPT

## 2020-01-29 PROCEDURE — 99213 OFFICE O/P EST LOW 20 MIN: CPT | Mod: 25

## 2020-01-29 RX ORDER — TAMSULOSIN HYDROCHLORIDE 0.4 MG/1
CAPSULE ORAL
Refills: 0 | Status: DISCONTINUED | COMMUNITY
End: 2020-01-29

## 2020-01-29 RX ORDER — DIGOXIN 0.06 MG/1
TABLET ORAL
Refills: 0 | Status: DISCONTINUED | COMMUNITY
End: 2020-01-29

## 2020-01-29 RX ORDER — METOPROLOL TARTRATE 50 MG/1
TABLET ORAL
Refills: 0 | Status: DISCONTINUED | COMMUNITY
End: 2020-01-29

## 2020-01-29 RX ORDER — OXYBUTYNIN CHLORIDE 2.5 MG/1
TABLET ORAL
Refills: 0 | Status: DISCONTINUED | COMMUNITY
End: 2020-01-29

## 2020-01-29 NOTE — HISTORY OF PRESENT ILLNESS
[de-identified] : 64M presents for sinonasal evaluation. Hx of L CWD mastoidectomy with Dr. Macario who he follows with regularly. He reports 6mo of progressive nasal congestion, facial pain/pressure over maxillary region, clear anterior rhinorrhea, PND. He also experiences acute episodes where all these symptoms worsen-- 2-3x over this time period. Recently was treated with cefdinir which helped his symptoms somewhat. Has been using afrin nightly for the last several weeks. No other nasal meds.\par \par I reviewed his CT head from 1/2019 which demonstrated clear paranasal sinuses.\par \par PMH: CVA x 2, afib, hypothyroidism, HTN\par Meds: eliquis, lipitor, levothyroxine, toprol

## 2020-01-29 NOTE — PHYSICAL EXAM
[Nasal Endoscopy Performed] : nasal endoscopy was performed, see procedure section for findings [FreeTextEntry9] : CWD cavity [Midline] : trachea located in midline position [Normal] : extraocular movements are normal

## 2020-01-29 NOTE — REVIEW OF SYSTEMS
[Ear Pain] : ear pain [Ear Itch] : ear itch [Hearing Loss] : hearing loss [Dizziness] : dizziness [Vertigo] : vertigo [Recurrent Ear Infections] : recurrent ear infections [Ear Drainage] : ear drainage [Lightheadedness] : lightheadedness [Nasal Congestion] : nasal congestion [Recurrent Sinus Infections] : recurrent sinus infections [Sense Of Smell Problem] : sense of smell problem [Easy Bleeding] : tendency for easy bleeding [Easy Bruising] : tendency for easy bruising [Negative] : Psychiatric

## 2020-02-18 ENCOUNTER — APPOINTMENT (OUTPATIENT)
Dept: CT IMAGING | Facility: CLINIC | Age: 65
End: 2020-02-18

## 2020-03-11 ENCOUNTER — APPOINTMENT (OUTPATIENT)
Dept: OTOLARYNGOLOGY | Facility: CLINIC | Age: 65
End: 2020-03-11
Payer: COMMERCIAL

## 2020-03-11 ENCOUNTER — LABORATORY RESULT (OUTPATIENT)
Age: 65
End: 2020-03-11

## 2020-03-11 VITALS
DIASTOLIC BLOOD PRESSURE: 70 MMHG | HEIGHT: 74 IN | BODY MASS INDEX: 28.88 KG/M2 | HEART RATE: 61 BPM | WEIGHT: 225 LBS | SYSTOLIC BLOOD PRESSURE: 108 MMHG

## 2020-03-11 PROCEDURE — 42800 BIOPSY OF THROAT: CPT

## 2020-03-11 PROCEDURE — 31231 NASAL ENDOSCOPY DX: CPT

## 2020-03-11 PROCEDURE — 99213 OFFICE O/P EST LOW 20 MIN: CPT | Mod: 25

## 2020-03-11 RX ORDER — METHYLPREDNISOLONE 4 MG/1
4 TABLET ORAL
Qty: 21 | Refills: 0 | Status: DISCONTINUED | COMMUNITY
Start: 2020-01-29 | End: 2020-03-11

## 2020-03-11 RX ORDER — SULFAMETHOXAZOLE AND TRIMETHOPRIM 800; 160 MG/1; MG/1
800-160 TABLET ORAL TWICE DAILY
Qty: 20 | Refills: 0 | Status: DISCONTINUED | COMMUNITY
Start: 2020-01-29 | End: 2020-03-11

## 2020-03-11 NOTE — HISTORY OF PRESENT ILLNESS
[de-identified] : 64M with CRS presents for follow-up. LCV 1/29/2020 at which time he had an acute infection-- tx w bactrim and medrol dosepak. Treatment contributed to symptomatic relief. Reports use of Flonase daily. Reports singificant improvement in nasal congestion since the last visit. Denies congestion, anterior rhinorrhea or PND, facial pain or pressure, recurrent infections. Was unable to get CT due to insurance issues.

## 2020-03-11 NOTE — REASON FOR VISIT
[Subsequent Evaluation] : a subsequent evaluation for [Other: _____] : [unfilled] [FreeTextEntry2] : nasal obstruction

## 2020-03-11 NOTE — PHYSICAL EXAM
[Nasal Endoscopy Performed] : nasal endoscopy was performed, see procedure section for findings [Normal] : mucosa is normal [de-identified] : s/p L CWD mastoid [FreeTextEntry1] : papillomatous lesion R soft palate adjacent to uvula, no mucosal ulceration

## 2020-05-13 ENCOUNTER — APPOINTMENT (OUTPATIENT)
Dept: OTOLARYNGOLOGY | Facility: CLINIC | Age: 65
End: 2020-05-13
Payer: COMMERCIAL

## 2020-05-13 VITALS — TEMPERATURE: 96.7 F

## 2020-05-13 PROCEDURE — 92557 COMPREHENSIVE HEARING TEST: CPT

## 2020-05-13 PROCEDURE — 99213 OFFICE O/P EST LOW 20 MIN: CPT | Mod: 25

## 2020-05-13 PROCEDURE — 92567 TYMPANOMETRY: CPT

## 2020-05-14 NOTE — PHYSICAL EXAM
[Midline] : trachea located in midline position [Normal] : no nystagmus [de-identified] : clean CWD AS - TM retracted - able to pop ear [] : [place]-Hallpike test is negative

## 2020-05-14 NOTE — DATA REVIEWED
[de-identified] : Mild to severe mixed loss, AS\par Mild to moderate mixed loss, AD\par no significant change from prior

## 2020-05-14 NOTE — HISTORY OF PRESENT ILLNESS
[de-identified] : 64M here for routine left ear cleaning. Pt with h/o left CWD. Pt denies otalgia, otorrhea, ear infections, tinnitus, or headaches related to hearing. Pt states he feels his hearing is about the same from last office visit. Reports that starting about 1 month ago- having vertigo-when moving head at certain angles- lasting about 10 mins each time- resolving on its own. Prior hx of CVA - no headaches, no episode longr than 10 min\par

## 2020-06-18 ENCOUNTER — APPOINTMENT (OUTPATIENT)
Dept: OTOLARYNGOLOGY | Facility: CLINIC | Age: 65
End: 2020-06-18
Payer: COMMERCIAL

## 2020-06-18 VITALS
TEMPERATURE: 98.3 F | SYSTOLIC BLOOD PRESSURE: 105 MMHG | HEIGHT: 74 IN | WEIGHT: 220 LBS | DIASTOLIC BLOOD PRESSURE: 66 MMHG | BODY MASS INDEX: 28.23 KG/M2 | HEART RATE: 61 BPM

## 2020-06-18 DIAGNOSIS — K13.70 UNSPECIFIED LESIONS OF ORAL MUCOSA: ICD-10-CM

## 2020-06-18 PROCEDURE — 31231 NASAL ENDOSCOPY DX: CPT

## 2020-06-18 PROCEDURE — 99213 OFFICE O/P EST LOW 20 MIN: CPT | Mod: 25

## 2020-06-18 NOTE — HISTORY OF PRESENT ILLNESS
[de-identified] : 65 year old male follow up CRS \par LCV 3/11/20\par Feels well.\par Intermittent nasal congestion-- worse w exertion\par No anterior rhinorrhea or PND, facial pain or pressure, acute sinus infection, good sense of smell\par Using flonase 2 sprays/1x day, netipot\par \par PMH: afib, stroke\par Meds: eliquis, cardiac meds

## 2020-06-18 NOTE — PHYSICAL EXAM
[Nasal Endoscopy Performed] : nasal endoscopy was performed, see procedure section for findings [de-identified] : L CWD cavity [Midline] : trachea located in midline position [Normal] : no rashes

## 2020-08-27 NOTE — DISCHARGE NOTE ADULT - NS AS DC ANTITHROMBOTIC YN RESTRICT
Possible cardiac etiology, CHF and diastolic dysfunction. EKG showed Aortic and mitral regurgitation, GVD is present, QTC at 600 with T inversion of infra-lateral leads possibly secondary due to amiodarone. Quick ECHO done that showed  EF 25, severe diastolic dysfunction. Spoke with Dr. De La Rosa who agreed with 50 mg of dobutamine. Asked Dr. Jones to see as soon as possible. yes

## 2020-09-16 ENCOUNTER — APPOINTMENT (OUTPATIENT)
Dept: OTOLARYNGOLOGY | Facility: CLINIC | Age: 65
End: 2020-09-16
Payer: COMMERCIAL

## 2020-09-16 VITALS
BODY MASS INDEX: 28.88 KG/M2 | DIASTOLIC BLOOD PRESSURE: 86 MMHG | HEIGHT: 74 IN | SYSTOLIC BLOOD PRESSURE: 135 MMHG | HEART RATE: 65 BPM | WEIGHT: 225 LBS

## 2020-09-16 DIAGNOSIS — Z87.09 PERSONAL HISTORY OF OTHER DISEASES OF THE RESPIRATORY SYSTEM: ICD-10-CM

## 2020-09-16 PROCEDURE — 99213 OFFICE O/P EST LOW 20 MIN: CPT | Mod: 25

## 2020-09-16 PROCEDURE — 31231 NASAL ENDOSCOPY DX: CPT

## 2020-09-16 RX ORDER — METOPROLOL TARTRATE 75 MG/1
TABLET, FILM COATED ORAL
Refills: 0 | Status: ACTIVE | COMMUNITY

## 2020-09-16 RX ORDER — METHYLPREDNISOLONE 4 MG/1
4 TABLET ORAL
Qty: 1 | Refills: 0 | Status: DISCONTINUED | COMMUNITY
Start: 2020-06-18 | End: 2020-09-16

## 2020-09-16 RX ORDER — SULFAMETHOXAZOLE AND TRIMETHOPRIM 800; 160 MG/1; MG/1
800-160 TABLET ORAL DAILY
Qty: 7 | Refills: 0 | Status: DISCONTINUED | COMMUNITY
Start: 2020-06-18 | End: 2020-09-16

## 2020-09-16 NOTE — PHYSICAL EXAM
[de-identified] : L CWD cavity with scant crusting [Nasal Endoscopy Performed] : nasal endoscopy was performed, see procedure section for findings [Midline] : trachea located in midline position [Normal] : no rashes

## 2020-09-16 NOTE — HISTORY OF PRESENT ILLNESS
[de-identified] : 65 year old M with hx of CRS presents for follow-up\par LCV 6/18/2020 at which time was written for Medrol Pack and oral Bactrim but did not take\par Nasal endo at that time demonstrates mild inflammation\par Feels well-- reports intermittent nasal congestion, improved since last visit\par Denies anterior rhinorrhea, PND and poor sense of smell\par Uses warm washcloth over face which helps w congestion-- nasal spray used as prescribed with good relief\par No recent sinus infections and fevers. Temperature WNL upon screening when entering facility.

## 2020-09-30 ENCOUNTER — TRANSCRIPTION ENCOUNTER (OUTPATIENT)
Age: 65
End: 2020-09-30

## 2020-11-18 ENCOUNTER — APPOINTMENT (OUTPATIENT)
Dept: OTOLARYNGOLOGY | Facility: CLINIC | Age: 65
End: 2020-11-18
Payer: COMMERCIAL

## 2020-11-18 DIAGNOSIS — H61.22 IMPACTED CERUMEN, LEFT EAR: ICD-10-CM

## 2020-11-18 PROCEDURE — 69210 REMOVE IMPACTED EAR WAX UNI: CPT

## 2020-11-18 PROCEDURE — 99213 OFFICE O/P EST LOW 20 MIN: CPT | Mod: 25

## 2020-11-18 NOTE — PHYSICAL EXAM
[Midline] : trachea located in midline position [Normal] : orientation to person, place, and time: normal [de-identified] : clean CWD AS - slight wax rmeoved - AD normal - no infeciotn AU [] : [place]-Hallpike test is negative

## 2020-12-16 PROBLEM — Z87.09 HISTORY OF ACUTE BACTERIAL SINUSITIS: Status: RESOLVED | Noted: 2018-10-17 | Resolved: 2020-12-16

## 2020-12-23 PROBLEM — Z87.09 HISTORY OF ACUTE SINUSITIS: Status: RESOLVED | Noted: 2020-01-29 | Resolved: 2020-12-23

## 2021-03-18 ENCOUNTER — APPOINTMENT (OUTPATIENT)
Dept: OTOLARYNGOLOGY | Facility: CLINIC | Age: 66
End: 2021-03-18
Payer: COMMERCIAL

## 2021-03-18 VITALS — HEIGHT: 74 IN | BODY MASS INDEX: 28.88 KG/M2 | WEIGHT: 225 LBS

## 2021-03-18 PROCEDURE — 99072 ADDL SUPL MATRL&STAF TM PHE: CPT

## 2021-03-18 PROCEDURE — 99213 OFFICE O/P EST LOW 20 MIN: CPT | Mod: 25

## 2021-03-18 PROCEDURE — 31231 NASAL ENDOSCOPY DX: CPT

## 2021-03-18 RX ORDER — ELECTROLYTES/DEXTROSE
SOLUTION, ORAL ORAL
Refills: 0 | Status: ACTIVE | COMMUNITY

## 2021-03-18 RX ORDER — FLUTICASONE PROPIONATE 50 UG/1
50 SPRAY, METERED NASAL
Qty: 1 | Refills: 4 | Status: DISCONTINUED | COMMUNITY
Start: 2020-01-29 | End: 2021-03-18

## 2021-03-18 NOTE — HISTORY OF PRESENT ILLNESS
[de-identified] : 65 yr old M with hx of acute sinusitis and chronic ETD presents for follow-up FOR crs\par LCV 9/16/2020 at which time recommended to continue with Flonase\par Reports 1 sinus infection since last visit, Dec 2020, prescribed oral antibiotics with good relief.  States intermittent nasal congestion and sinus swelling/inflammation\par Denies anterior rhinorrhea or PND, facial pain and pressure, poor sense of smell, epistaxis, recent fevers\par No recent sinus infections\par No nasal sprays used at the moment

## 2021-03-18 NOTE — REASON FOR VISIT
[Subsequent Evaluation] : a subsequent evaluation for [Other: _____] : [unfilled] [FreeTextEntry2] : follow up for CRS

## 2021-05-19 ENCOUNTER — APPOINTMENT (OUTPATIENT)
Dept: OTOLARYNGOLOGY | Facility: CLINIC | Age: 66
End: 2021-05-19
Payer: COMMERCIAL

## 2021-05-19 ENCOUNTER — APPOINTMENT (OUTPATIENT)
Dept: PHARMACY | Facility: CLINIC | Age: 66
End: 2021-05-19
Payer: SELF-PAY

## 2021-05-19 PROCEDURE — 99213 OFFICE O/P EST LOW 20 MIN: CPT | Mod: 25

## 2021-05-19 PROCEDURE — 99072 ADDL SUPL MATRL&STAF TM PHE: CPT

## 2021-05-19 PROCEDURE — 92567 TYMPANOMETRY: CPT

## 2021-05-19 PROCEDURE — V5010 ASSESSMENT FOR HEARING AID: CPT | Mod: NC

## 2021-05-19 PROCEDURE — 92557 COMPREHENSIVE HEARING TEST: CPT

## 2021-06-06 NOTE — PHYSICAL EXAM
[Midline] : trachea located in midline position [Normal] : orientation to person, place, and time: normal [de-identified] : clean CWD AS - slight wax removed - AD normal - no infection AU - sprayed with mastoid powder [] : [place]-Hallpike test is negative

## 2021-09-23 ENCOUNTER — APPOINTMENT (OUTPATIENT)
Dept: OTOLARYNGOLOGY | Facility: CLINIC | Age: 66
End: 2021-09-23
Payer: COMMERCIAL

## 2021-09-23 VITALS
WEIGHT: 225 LBS | DIASTOLIC BLOOD PRESSURE: 82 MMHG | BODY MASS INDEX: 28.88 KG/M2 | HEART RATE: 56 BPM | HEIGHT: 74 IN | SYSTOLIC BLOOD PRESSURE: 125 MMHG

## 2021-09-23 DIAGNOSIS — H90.2 CONDUCTIVE HEARING LOSS, UNSPECIFIED: ICD-10-CM

## 2021-09-23 DIAGNOSIS — J32.9 CHRONIC SINUSITIS, UNSPECIFIED: ICD-10-CM

## 2021-09-23 PROCEDURE — 31231 NASAL ENDOSCOPY DX: CPT

## 2021-09-23 PROCEDURE — 99214 OFFICE O/P EST MOD 30 MIN: CPT | Mod: 25

## 2021-09-23 RX ORDER — SULFAMETHOXAZOLE AND TRIMETHOPRIM 800; 160 MG/1; MG/1
800-160 TABLET ORAL TWICE DAILY
Qty: 14 | Refills: 0 | Status: DISCONTINUED | COMMUNITY
Start: 2021-03-18 | End: 2021-09-23

## 2021-09-23 NOTE — HISTORY OF PRESENT ILLNESS
[de-identified] : 66 year old male hx of CRS\par LCV 03/18/2021\par Reports use of flonase and Netti pot every morning with good relief\par Reports intermittent clear anterior rhinorrhea\par Denies current nasal congestion, PND, facial pain/pressure\par States sense of smell is good \par Denies sinus infections since the last visit  \par Follows with Dr. Macario for L-sided chronic ear disease

## 2021-09-23 NOTE — PHYSICAL EXAM
[Nasal Endoscopy Performed] : nasal endoscopy was performed, see procedure section for findings [Midline] : trachea located in midline position [Normal] : no rashes [de-identified] : L CWD cavity

## 2022-03-30 ENCOUNTER — APPOINTMENT (OUTPATIENT)
Dept: OTOLARYNGOLOGY | Facility: CLINIC | Age: 67
End: 2022-03-30

## 2022-05-18 ENCOUNTER — NON-APPOINTMENT (OUTPATIENT)
Age: 67
End: 2022-05-18

## 2022-05-18 ENCOUNTER — APPOINTMENT (OUTPATIENT)
Dept: OTOLARYNGOLOGY | Facility: CLINIC | Age: 67
End: 2022-05-18
Payer: COMMERCIAL

## 2022-05-18 DIAGNOSIS — H90.72 MIXED CONDUCTIVE AND SENSORINEURAL HEARING LOSS, UNILATERAL, LEFT EAR, WITH UNRESTRICTED HEARING ON THE CONTRALATERAL SIDE: ICD-10-CM

## 2022-05-18 DIAGNOSIS — H70.10 CHRONIC MASTOIDITIS, UNSPECIFIED EAR: ICD-10-CM

## 2022-05-18 DIAGNOSIS — H69.80 OTHER SPECIFIED DISORDERS OF EUSTACHIAN TUBE, UNSPECIFIED EAR: ICD-10-CM

## 2022-05-18 PROCEDURE — 92567 TYMPANOMETRY: CPT

## 2022-05-18 PROCEDURE — 92557 COMPREHENSIVE HEARING TEST: CPT

## 2022-05-18 PROCEDURE — 99213 OFFICE O/P EST LOW 20 MIN: CPT

## 2022-06-17 NOTE — DATA REVIEWED
[de-identified] : Right - mild, essentially  CHL\par Left mild to profound mixed HL\par Tymp\par Right -Type C tympanogram consistent with excessive negative middle ear pressure\par Left - Type B w/large ECV

## 2022-06-17 NOTE — HISTORY OF PRESENT ILLNESS
[de-identified] : 66 year old man, annual follow up for Left CWD, routine ear cleaning. History of mixed hearing loss, chronic mastoiditis, hearing aid clearance given.  Not wearing hearing aids as of yet.  Reports no issues or concerns, overall well.  Denies otalgia, otorrhea, tinnitus, dizziness, vertigo, headaches, ec

## 2022-06-17 NOTE — PHYSICAL EXAM
[Midline] : trachea located in midline position [Normal] : orientation to person, place, and time: normal [de-identified] : clean CWD AS- AD normal - no infection AU - sprayed with mastoid powder [] : [place]-Hallpike test is negative

## 2023-05-22 ENCOUNTER — APPOINTMENT (OUTPATIENT)
Dept: OTOLARYNGOLOGY | Facility: CLINIC | Age: 68
End: 2023-05-22

## 2023-12-19 NOTE — ASU PREOP CHECKLIST - NSWEIGHTCALCTOOLDRUG_GEN_A_CORE
Problem: Physical Therapy - Adult  Goal: By Discharge: Performs mobility at highest level of function for planned discharge setting.  See evaluation for individualized goals.  Description: Physical Therapy Short Term Goals  Initiated 12/2/2023 and to be accomplished within 7 day(s) [12/9/23]  1.  Patient will roll right with CGA and use of rail and roll left with modified independence with bed rail. (Goal Met 12/11/23)       Update:  Patient will roll right with modified [place] and use of rail. To be reassessed 12/16/23 (Goal  Met 12/8/23).  2.  Patient will perform supine to sit and sit to supine in bed with minimal assistance with bed rail. (Goal Met 12/11/23)        Update: Patient will perform supine to sit and sit to supine in bed with SBA with bed rail. To be reassessed 12/16/23 (Goal Met 12/18/23).  3.  Patient will transfer from bed to chair and chair to bed with minimal assistance using the least restrictive device. Goal Met 12/11/23)       Update: Patient will transfer from bed to chair and chair to bed with SBA using the least restrictive device. To be reassessed 12/16/23 (Goal Met supervision 12/18/23)  4.  Patient will perform sit to stand with minimal assistance. (Goal Met 12/11/23)       Update: Patient will perform sit to stand with Supervision. To be reassessed 12/16/23 (Goal Met 12/18/23)  5.  Patient will ambulate with minimal assistance for 50 feet with the least restrictive device. (Goal Met 12/11/23)       Update: Patient will ambulate with supervision for 200 feet with the least restrictive device. (Goal Met 12/18/23)    6.  Patient will propel w/c 150 ft with rohini technique using R UE and R Le and supervision for mobility on unit. (Goal Met 12/11/23)    Physical Therapy Long Term Goals  Initiated 12/2/2023 and to be accomplished within 28 day(s) [12/30/23]  1.  Patient will supine to sit, sit to supine, roll right, and roll left in bed with modified independence. (Goal Met 12/19/23)   Able to Propel 52 ft 217 feet   Assist Level   Theresa/I    Curbs/ramps assistance required       Wheelchair management manages B brakes with brake extension added to L brake due to weakness in L UE/     Comments  The patient propels using RUE and BLEs       GAIT Discharge Assessment 12/19/2023   Quality of Gait     Gait Deviations Decreased pace        WALKING INDEPENDENCE Initial Assessment Discharge Assessment   Assistive device Rolling Walker, [address] [facility] (RW 1st trial; SBQC 2nd trial) Rolling Walker   Ambulation assistance - surface Moderate assistance  Level tile Modified Independent  Level tile     Distance 10 ft with RW; 5 ft with SBQC 277 feet   Comments decreased heel-toe contact during initial stance phase L foot; L knee hyperextension; mild scissoring of L LE         STEPS/STAIRS Initial Assessment Discharge Assessment   Steps/Stairs ambulated 3  4\" 4  6\"   Rail Use Right ascending   Bilateral     Assistance Level Moderate assistance Supervision   Comments       Curbs/Ramps 6\" (up/down x 2 reps with RW and CGA; up/down x 2 reps with SPC;  CGA with RW, min A/HHA with SPC) 6\" (with FWW) with supervision     Neuro Re-Education:  Standing beach ball toss/catch to facilitate hip, knee and ankle strategies for balance. Therapeutic Exercises:       EXERCISE   Sets   Reps   Active Active Assist   Passive Self ROM   Comments   Ankle Pumps DF/PF 3 15  [x] [] [] [] Standing   Quad Sets/Glut Sets   [] [] [] []    Hamstring Sets 3 15 [x] [] [] [] With red TB   Short Arc Quads   [] [] [] []    Heel Slides   [] [] [] []    Straight Leg Raises   [] [] [] []    Hip Abd/Add 3 15 [x] [] [] [] Standing in parallel bars   Long Arc Quads 3 15 [x] [] [] [] 3# ankle weights   Seated Marching   [] [] [] []    Standing Marching 3 15 [x] [] [] [] 3# ankle weights      [] [] [] []        ASSESSMENT:  The patient has progressed well with all functional mobility tasks and to achieve all LTGs stated below.  The patient  used